# Patient Record
Sex: FEMALE | Race: WHITE
[De-identification: names, ages, dates, MRNs, and addresses within clinical notes are randomized per-mention and may not be internally consistent; named-entity substitution may affect disease eponyms.]

---

## 2018-11-17 ENCOUNTER — HOSPITAL ENCOUNTER (EMERGENCY)
Dept: HOSPITAL 63 - ER | Age: 83
Discharge: HOME | End: 2018-11-17
Payer: MEDICARE

## 2018-11-17 VITALS — HEIGHT: 63 IN | BODY MASS INDEX: 25.08 KG/M2 | WEIGHT: 141.54 LBS

## 2018-11-17 VITALS — SYSTOLIC BLOOD PRESSURE: 126 MMHG | DIASTOLIC BLOOD PRESSURE: 61 MMHG

## 2018-11-17 DIAGNOSIS — Z95.0: ICD-10-CM

## 2018-11-17 DIAGNOSIS — E78.00: ICD-10-CM

## 2018-11-17 DIAGNOSIS — E11.65: ICD-10-CM

## 2018-11-17 DIAGNOSIS — Z91.041: ICD-10-CM

## 2018-11-17 DIAGNOSIS — R55: Primary | ICD-10-CM

## 2018-11-17 DIAGNOSIS — Z88.6: ICD-10-CM

## 2018-11-17 LAB
ALBUMIN SERPL-MCNC: 3.9 G/DL (ref 3.4–5)
ALBUMIN/GLOB SERPL: 1.3 {RATIO} (ref 1–1.7)
ALP SERPL-CCNC: 70 U/L (ref 46–116)
ALT SERPL-CCNC: 22 U/L (ref 14–59)
ANION GAP SERPL CALC-SCNC: 16 MMOL/L (ref 6–14)
AST SERPL-CCNC: 19 U/L (ref 15–37)
BASOPHILS # BLD AUTO: 0 X10^3/UL (ref 0–0.2)
BASOPHILS NFR BLD: 0 % (ref 0–3)
BILIRUB SERPL-MCNC: 1 MG/DL (ref 0.2–1)
BUN/CREAT SERPL: 11 (ref 6–20)
CA-I SERPL ISE-MCNC: 13 MG/DL (ref 7–20)
CALCIUM SERPL-MCNC: 9.2 MG/DL (ref 8.5–10.1)
CHLORIDE SERPL-SCNC: 95 MMOL/L (ref 98–107)
CO2 SERPL-SCNC: 22 MMOL/L (ref 21–32)
CREAT SERPL-MCNC: 1.2 MG/DL (ref 0.6–1)
EOSINOPHIL NFR BLD: 0 % (ref 0–3)
EOSINOPHIL NFR BLD: 0 X10^3/UL (ref 0–0.7)
ERYTHROCYTE [DISTWIDTH] IN BLOOD BY AUTOMATED COUNT: 13 % (ref 11.5–14.5)
GFR SERPLBLD BASED ON 1.73 SQ M-ARVRAT: 42.4 ML/MIN
GLOBULIN SER-MCNC: 3.1 G/DL (ref 2.2–3.8)
GLUCOSE SERPL-MCNC: 391 MG/DL (ref 70–99)
HCT VFR BLD CALC: 40 % (ref 36–47)
HGB BLD-MCNC: 13.6 G/DL (ref 12–15.5)
LYMPHOCYTES # BLD: 0.9 X10^3/UL (ref 1–4.8)
LYMPHOCYTES NFR BLD AUTO: 14 % (ref 24–48)
MCH RBC QN AUTO: 31 PG (ref 25–35)
MCHC RBC AUTO-ENTMCNC: 34 G/DL (ref 31–37)
MCV RBC AUTO: 91 FL (ref 79–100)
MONO #: 0.9 X10^3/UL (ref 0–1.1)
MONOCYTES NFR BLD: 14 % (ref 0–9)
NEUT #: 4.8 X10^3UL (ref 1.8–7.7)
NEUTROPHILS NFR BLD AUTO: 72 % (ref 31–73)
PLATELET # BLD AUTO: 147 X10^3/UL (ref 140–400)
POTASSIUM SERPL-SCNC: 4.2 MMOL/L (ref 3.5–5.1)
PROT SERPL-MCNC: 7 G/DL (ref 6.4–8.2)
RBC # BLD AUTO: 4.42 X10^6/UL (ref 3.5–5.4)
SODIUM SERPL-SCNC: 133 MMOL/L (ref 136–145)
WBC # BLD AUTO: 6.7 X10^3/UL (ref 4–11)

## 2018-11-17 PROCEDURE — 80053 COMPREHEN METABOLIC PANEL: CPT

## 2018-11-17 PROCEDURE — 99285 EMERGENCY DEPT VISIT HI MDM: CPT

## 2018-11-17 PROCEDURE — 82947 ASSAY GLUCOSE BLOOD QUANT: CPT

## 2018-11-17 PROCEDURE — 93005 ELECTROCARDIOGRAM TRACING: CPT

## 2018-11-17 PROCEDURE — 85025 COMPLETE CBC W/AUTO DIFF WBC: CPT

## 2018-11-17 PROCEDURE — 36415 COLL VENOUS BLD VENIPUNCTURE: CPT

## 2018-11-17 PROCEDURE — 96360 HYDRATION IV INFUSION INIT: CPT

## 2018-11-17 NOTE — PHYS DOC
Past History


Past Medical History:  Diabetes, High Cholesterol, Other


Past Surgical History:  Colectomy, Hysterectomy, Oophorectomy, Pacemaker


Alcohol Use:  None


Drug Use:  None





Adult General


Chief Complaint


Chief Complaint:  SYNCOPE





HPI


HPI





Patient is an 88-year-old female who presents with report of having had 

syncopal episode. Patient had been shopping at [employer] when all of a sudden she 

became nauseated and broke out into a sweat. She states that she has had many 

syncopal episodes in her life and she knew that she was getting ready to pass 

out so she just sat down as quickly as she could. Patient is unsure how long 

she was out for. She denies any chest pain or shortness of breath. Patient 

states that she is still just a little bit nauseated. She denies palpitations.





Review of Systems


Review of Systems





Constitutional: Denies fever or chills []


Respiratory: Denies cough or shortness of breath []


Cardiovascular: No additional information not addressed in HPI []


GI: Denies abdominal pain, vomiting or diarrhea. []


: Denies dysuria or hematuria []


Musculoskeletal: Denies back pain or joint pain []


Neurologic: Complains of syncopal episode.[]





All other systems were reviewed and found to be within normal limits, except as 

documented in this note.





Current Medications


Current Medications





Current Medications








 Medications


  (Trade)  Dose


 Ordered  Sig/Rosetta  Start Time


 Stop Time Status Last Admin


Dose Admin


 


 Sodium Chloride  500 ml @ 


 500 mls/hr  Q1H  11/17/18 13:00


 11/17/18 13:24 DC 11/17/18 13:24


500 MLS/HR











Allergies


Allergies





Allergies








Coded Allergies Type Severity Reaction Last Updated Verified


 


  aspirin Allergy Intermediate  12/10/14 Yes


 


  I S O L A T I O N *CONTACT* Allergy Unknown  7/13/15 No











Physical Exam


Physical Exam





Constitutional: Well developed, well nourished, no acute distress, non-toxic 

appearance. []


HENT: Normocephalic, atraumatic, bilateral external ears normal, oropharynx 

moist, no oral exudates, nose normal. []


Eyes: PERRLA, EOMI, conjunctiva normal, no discharge. [] 


Neck: Normal range of motion, no tenderness, supple, no stridor. [] 


Cardiovascular:Heart rate regular rhythm []


Lungs & Thorax:  Bilateral breath sounds clear to auscultation []


Abdomen: Bowel sounds normal, soft, no tenderness. [] 


Skin: Warm, dry, no erythema, no rash. [] 


Extremities: No tenderness, no cyanosis, no clubbing, ROM intact, no edema. [] 


Neurologic: Alert and oriented X 3, normal motor function, normal sensory 

function, no focal deficits noted. []





Current Patient Data


Vital Signs





 Vital Signs








  Date Time  Temp Pulse Resp B/P (MAP) Pulse Ox O2 Delivery O2 Flow Rate FiO2


 


11/17/18 12:23 98.2 71 18  97 Room Air  








Lab Results





 Laboratory Tests








Test


 11/17/18


12:27


 


White Blood Count


 6.7 x10^3/uL


(4.0-11.0)


 


Red Blood Count


 4.42 x10^6/uL


(3.50-5.40)


 


Hemoglobin


 13.6 g/dL


(12.0-15.5)


 


Hematocrit


 40.0 %


(36.0-47.0)


 


Mean Corpuscular Volume


 91 fL ()





 


Mean Corpuscular Hemoglobin 31 pg (25-35)  


 


Mean Corpuscular Hemoglobin


Concent 34 g/dL


(31-37)


 


Red Cell Distribution Width


 13.0 %


(11.5-14.5)


 


Platelet Count


 147 x10^3/uL


(140-400)


 


Neutrophils (%) (Auto) 72 % (31-73)  


 


Lymphocytes (%) (Auto) 14 % (24-48)  L


 


Monocytes (%) (Auto) 14 % (0-9)  H


 


Eosinophils (%) (Auto) 0 % (0-3)  


 


Basophils (%) (Auto) 0 % (0-3)  


 


Neutrophils # (Auto)


 4.8 x10^3uL


(1.8-7.7)


 


Lymphocytes # (Auto)


 0.9 x10^3/uL


(1.0-4.8)  L


 


Monocytes # (Auto)


 0.9 x10^3/uL


(0.0-1.1)


 


Eosinophils # (Auto)


 0.0 x10^3/uL


(0.0-0.7)


 


Basophils # (Auto)


 0.0 x10^3/uL


(0.0-0.2)


 


Sodium Level


 133 mmol/L


(136-145)  L


 


Potassium Level


 4.2 mmol/L


(3.5-5.1)


 


Chloride Level


 95 mmol/L


()  L


 


Carbon Dioxide Level


 22 mmol/L


(21-32)


 


Anion Gap 16 (6-14)  H


 


Blood Urea Nitrogen


 13 mg/dL


(7-20)


 


Creatinine


 1.2 mg/dL


(0.6-1.0)  H


 


Estimated GFR


(Cockcroft-Gault) 42.4  





 


BUN/Creatinine Ratio 11 (6-20)  


 


Glucose Level


 391 mg/dL


(70-99)  H


 


Calcium Level


 9.2 mg/dL


(8.5-10.1)


 


Total Bilirubin


 1.0 mg/dL


(0.2-1.0)


 


Aspartate Amino Transferase


(AST) 19 U/L (15-37)





 


Alanine Aminotransferase (ALT)


 22 U/L (14-59)





 


Alkaline Phosphatase


 70 U/L


()


 


Total Protein


 7.0 g/dL


(6.4-8.2)


 


Albumin


 3.9 g/dL


(3.4-5.0)


 


Albumin/Globulin Ratio 1.3 (1.0-1.7)  











EKG


EKG


EKG demonstrates sinus rhythm with rate of 75.





Radiology/Procedures


Radiology/Procedures


[]





Course & Med Decision Making


Course & Med Decision Making


Pertinent Labs and Imaging studies reviewed. (See chart for details)





[]





Dragon Disclaimer


Dragon Disclaimer


This electronic medical record was generated, in whole or in part, using a 

voice recognition dictation system.





Departure


Departure:


Impression:  


 Primary Impression:  


 Vasovagal syncope


 Additional Impression:  


 Hyperglycemia due to type 2 diabetes mellitus


Disposition:  01 HOME, SELF-CARE


Condition:  STABLE


Referrals:  


DOUG SINHA MD (PCP)


Patient Instructions:  Hyperglycemia, Syncope, Type 2 Diabetes Mellitus, Adult





Problem Qualifiers








 Additional Impression:  


 Hyperglycemia due to type 2 diabetes mellitus


 Diabetes mellitus long term insulin use:  without long term use  Qualified 

Codes:  E11.65 - Type 2 diabetes mellitus with hyperglycemia








PINEDA REYNA Jr. DO Nov 17, 2018 14:26

## 2018-11-19 ENCOUNTER — HOSPITAL ENCOUNTER (INPATIENT)
Dept: HOSPITAL 63 - ER | Age: 83
LOS: 2 days | Discharge: HOME | DRG: 637 | End: 2018-11-21
Attending: INTERNAL MEDICINE | Admitting: INTERNAL MEDICINE
Payer: MEDICARE

## 2018-11-19 VITALS — SYSTOLIC BLOOD PRESSURE: 110 MMHG | DIASTOLIC BLOOD PRESSURE: 68 MMHG

## 2018-11-19 VITALS — DIASTOLIC BLOOD PRESSURE: 61 MMHG | SYSTOLIC BLOOD PRESSURE: 98 MMHG

## 2018-11-19 VITALS — HEIGHT: 63 IN | WEIGHT: 134.37 LBS | BODY MASS INDEX: 23.81 KG/M2

## 2018-11-19 VITALS — SYSTOLIC BLOOD PRESSURE: 111 MMHG | DIASTOLIC BLOOD PRESSURE: 66 MMHG

## 2018-11-19 DIAGNOSIS — E87.1: ICD-10-CM

## 2018-11-19 DIAGNOSIS — E11.65: ICD-10-CM

## 2018-11-19 DIAGNOSIS — E87.6: ICD-10-CM

## 2018-11-19 DIAGNOSIS — Z90.710: ICD-10-CM

## 2018-11-19 DIAGNOSIS — E78.5: ICD-10-CM

## 2018-11-19 DIAGNOSIS — N17.0: ICD-10-CM

## 2018-11-19 DIAGNOSIS — M54.5: ICD-10-CM

## 2018-11-19 DIAGNOSIS — E11.00: Primary | ICD-10-CM

## 2018-11-19 DIAGNOSIS — Z95.0: ICD-10-CM

## 2018-11-19 DIAGNOSIS — E11.43: ICD-10-CM

## 2018-11-19 DIAGNOSIS — Z82.5: ICD-10-CM

## 2018-11-19 DIAGNOSIS — I49.5: ICD-10-CM

## 2018-11-19 DIAGNOSIS — M15.9: ICD-10-CM

## 2018-11-19 DIAGNOSIS — B37.49: ICD-10-CM

## 2018-11-19 DIAGNOSIS — Z88.8: ICD-10-CM

## 2018-11-19 DIAGNOSIS — I10: ICD-10-CM

## 2018-11-19 DIAGNOSIS — Z82.0: ICD-10-CM

## 2018-11-19 DIAGNOSIS — E86.0: ICD-10-CM

## 2018-11-19 DIAGNOSIS — E11.649: ICD-10-CM

## 2018-11-19 DIAGNOSIS — Z90.49: ICD-10-CM

## 2018-11-19 DIAGNOSIS — Z80.1: ICD-10-CM

## 2018-11-19 DIAGNOSIS — H90.5: ICD-10-CM

## 2018-11-19 DIAGNOSIS — E78.00: ICD-10-CM

## 2018-11-19 DIAGNOSIS — I48.0: ICD-10-CM

## 2018-11-19 LAB
ALBUMIN SERPL-MCNC: 3.4 G/DL (ref 3.4–5)
ALBUMIN/GLOB SERPL: 0.9 {RATIO} (ref 1–1.7)
ALP SERPL-CCNC: 71 U/L (ref 46–116)
ALT SERPL-CCNC: 22 U/L (ref 14–59)
ANION GAP SERPL CALC-SCNC: 10 MMOL/L (ref 6–14)
APTT PPP: YELLOW S
AST SERPL-CCNC: 18 U/L (ref 15–37)
BACTERIA #/AREA URNS HPF: (no result) /HPF
BASOPHILS # BLD AUTO: 0 X10^3/UL (ref 0–0.2)
BASOPHILS NFR BLD: 0 % (ref 0–3)
BILIRUB SERPL-MCNC: 1.2 MG/DL (ref 0.2–1)
BILIRUB UR QL STRIP: (no result)
BUN/CREAT SERPL: 12 (ref 6–20)
CA-I SERPL ISE-MCNC: 14 MG/DL (ref 7–20)
CALCIUM SERPL-MCNC: 8.8 MG/DL (ref 8.5–10.1)
CHLORIDE SERPL-SCNC: 94 MMOL/L (ref 98–107)
CO2 SERPL-SCNC: 25 MMOL/L (ref 21–32)
CREAT SERPL-MCNC: 1.2 MG/DL (ref 0.6–1)
EOSINOPHIL NFR BLD: 0 % (ref 0–3)
EOSINOPHIL NFR BLD: 0 X10^3/UL (ref 0–0.7)
ERYTHROCYTE [DISTWIDTH] IN BLOOD BY AUTOMATED COUNT: 13 % (ref 11.5–14.5)
FIBRINOGEN PPP-MCNC: (no result) MG/DL
GFR SERPLBLD BASED ON 1.73 SQ M-ARVRAT: 42.4 ML/MIN
GLOBULIN SER-MCNC: 3.8 G/DL (ref 2.2–3.8)
GLUCOSE SERPL-MCNC: 500 MG/DL (ref 70–99)
GLUCOSE UR STRIP-MCNC: >=1000 MG/DL
HCT VFR BLD CALC: 36.5 % (ref 36–47)
HGB BLD-MCNC: 12.5 G/DL (ref 12–15.5)
LYMPHOCYTES # BLD: 0.8 X10^3/UL (ref 1–4.8)
LYMPHOCYTES NFR BLD AUTO: 11 % (ref 24–48)
MCH RBC QN AUTO: 31 PG (ref 25–35)
MCHC RBC AUTO-ENTMCNC: 34 G/DL (ref 31–37)
MCV RBC AUTO: 89 FL (ref 79–100)
MONO #: 1 X10^3/UL (ref 0–1.1)
MONOCYTES NFR BLD: 14 % (ref 0–9)
NEUT #: 5.3 X10^3UL (ref 1.8–7.7)
NEUTROPHILS NFR BLD AUTO: 75 % (ref 31–73)
NITRITE UR QL STRIP: (no result)
PLATELET # BLD AUTO: 150 X10^3/UL (ref 140–400)
POTASSIUM SERPL-SCNC: 3.5 MMOL/L (ref 3.5–5.1)
PROT SERPL-MCNC: 7.2 G/DL (ref 6.4–8.2)
RBC # BLD AUTO: 4.09 X10^6/UL (ref 3.5–5.4)
RBC #/AREA URNS HPF: (no result) /HPF (ref 0–2)
SODIUM SERPL-SCNC: 129 MMOL/L (ref 136–145)
SP GR UR STRIP: <=1.005
SQUAMOUS #/AREA URNS LPF: (no result) /LPF
UROBILINOGEN UR-MCNC: 0.2 MG/DL
WBC # BLD AUTO: 7.1 X10^3/UL (ref 4–11)
WBC #/AREA URNS HPF: (no result) /HPF (ref 0–4)
YEAST #/AREA URNS HPF: PRESENT /HPF

## 2018-11-19 PROCEDURE — 93005 ELECTROCARDIOGRAM TRACING: CPT

## 2018-11-19 PROCEDURE — 82010 KETONE BODYS QUAN: CPT

## 2018-11-19 PROCEDURE — 82947 ASSAY GLUCOSE BLOOD QUANT: CPT

## 2018-11-19 PROCEDURE — 80061 LIPID PANEL: CPT

## 2018-11-19 PROCEDURE — 36415 COLL VENOUS BLD VENIPUNCTURE: CPT

## 2018-11-19 PROCEDURE — 87186 SC STD MICRODIL/AGAR DIL: CPT

## 2018-11-19 PROCEDURE — 96361 HYDRATE IV INFUSION ADD-ON: CPT

## 2018-11-19 PROCEDURE — 85025 COMPLETE CBC W/AUTO DIFF WBC: CPT

## 2018-11-19 PROCEDURE — 93306 TTE W/DOPPLER COMPLETE: CPT

## 2018-11-19 PROCEDURE — 80053 COMPREHEN METABOLIC PANEL: CPT

## 2018-11-19 PROCEDURE — 80048 BASIC METABOLIC PNL TOTAL CA: CPT

## 2018-11-19 PROCEDURE — 85651 RBC SED RATE NONAUTOMATED: CPT

## 2018-11-19 PROCEDURE — 87641 MR-STAPH DNA AMP PROBE: CPT

## 2018-11-19 PROCEDURE — 81001 URINALYSIS AUTO W/SCOPE: CPT

## 2018-11-19 PROCEDURE — 86140 C-REACTIVE PROTEIN: CPT

## 2018-11-19 PROCEDURE — 87086 URINE CULTURE/COLONY COUNT: CPT

## 2018-11-19 PROCEDURE — 96374 THER/PROPH/DIAG INJ IV PUSH: CPT

## 2018-11-19 PROCEDURE — 85027 COMPLETE CBC AUTOMATED: CPT

## 2018-11-19 RX ADMIN — INSULIN LISPRO SCH UNITS: 100 INJECTION, SOLUTION INTRAVENOUS; SUBCUTANEOUS at 18:15

## 2018-11-19 RX ADMIN — APIXABAN SCH MG: 5 TABLET, FILM COATED ORAL at 20:30

## 2018-11-19 NOTE — PHYS DOC
Past History


Past Medical History:  Diabetes, High Cholesterol, Other


Past Surgical History:  Colectomy, Hysterectomy, Oophorectomy, Pacemaker


Alcohol Use:  None


Drug Use:  None





Adult General


Chief Complaint


Chief Complaint:  BLOOD SUGAR PROBLEM





HPI


HPI


88-year-old female presents with hyperglycemia. She states that her blood sugar 

was over 500 today. Patient is feeling okay except for she has had 2 days 

increased urination and feels more fatigued than normal. She has never been 

placed on diabetes medications long-term. She was trialed on the medicine at 

one point. Her physician was very concerned about her becoming hypoglycemic. 

She denies fever or chills.





Review of Systems


Review of Systems





Constitutional: Fatigue.  Denies fever or chills []


Eyes: Denies change in visual acuity, redness, or eye pain []


HENT: Denies nasal congestion or sore throat []


Respiratory: Denies cough or shortness of breath []


Cardiovascular: No additional information not addressed in HPI []


GI: Denies abdominal pain, nausea, vomiting, bloody stools or diarrhea []


: Frequent urination[]


Musculoskeletal: Denies back pain or joint pain []


Integument: Denies rash or skin lesions []


Neurologic: Denies headache, focal weakness or sensory changes []


Endocrine: Denies polyuria or polydipsia []





All other systems were reviewed and found to be within normal limits, except as 

documented in this note.





Current Medications


Current Medications





Current Medications








 Medications


  (Trade)  Dose


 Ordered  Sig/Rosetta  Start Time


 Stop Time Status Last Admin


Dose Admin


 


 Insulin Human


 Regular


  (HumuLIN R VIAL)  10 unit  1X  ONCE  11/19/18 15:00


 11/19/18 15:01  11/19/18 14:51


10 UNIT


 


 Sodium Chloride  1,000 ml @ 


 1,000 mls/hr  1X  ONCE  11/19/18 14:30


 11/19/18 15:29  11/19/18 14:30


1,000 MLS/HR











Allergies


Allergies





Allergies








Coded Allergies Type Severity Reaction Last Updated Verified


 


  aspirin Allergy Intermediate  12/10/14 Yes


 


  I S O L A T I O N *CONTACT* Allergy Unknown  7/13/15 No











Physical Exam


Physical Exam





Constitutional: Well developed, well nourished, no acute distress, non-toxic 

appearance. []


HENT: Normocephalic, atraumatic, bilateral external ears normal, oropharynx 

moist, no oral exudates, nose normal. []


Eyes: PERRLA, EOMI, conjunctiva normal, no discharge. [] 


Neck: Normal range of motion, no tenderness, supple, no stridor. [] 


Cardiovascular:Heart rate regular rhythm, no murmur []


Lungs & Thorax:  Bilateral breath sounds clear to auscultation []


Abdomen: Bowel sounds normal, soft, no tenderness, no masses, no pulsatile 

masses. [] 


Skin: Warm, dry, no erythema, no rash. [] 


Back: No tenderness, no CVA tenderness. [] 


Extremities: No tenderness, no cyanosis, no clubbing, ROM intact, no edema. [] 


Neurologic: Alert and oriented X 3, normal motor function, normal sensory 

function, no focal deficits noted. []


Psychologic: Affect normal, judgement normal, mood normal. []





Current Patient Data


Lab Results





 Laboratory Tests








Test


 11/19/18


13:39 11/19/18


13:50


 


Glucose (Fingerstick)


 534 mg/dL


(70-99)  *H 





 


White Blood Count


 


 7.1 x10^3/uL


(4.0-11.0)


 


Red Blood Count


 


 4.09 x10^6/uL


(3.50-5.40)


 


Hemoglobin


 


 12.5 g/dL


(12.0-15.5)


 


Hematocrit


 


 36.5 %


(36.0-47.0)


 


Mean Corpuscular Volume


 


 89 fL ()





 


Mean Corpuscular Hemoglobin  31 pg (25-35)  


 


Mean Corpuscular Hemoglobin


Concent 


 34 g/dL


(31-37)


 


Red Cell Distribution Width


 


 13.0 %


(11.5-14.5)


 


Platelet Count


 


 150 x10^3/uL


(140-400)


 


Neutrophils (%) (Auto)  75 % (31-73)  H


 


Lymphocytes (%) (Auto)  11 % (24-48)  L


 


Monocytes (%) (Auto)  14 % (0-9)  H


 


Eosinophils (%) (Auto)  0 % (0-3)  


 


Basophils (%) (Auto)  0 % (0-3)  


 


Neutrophils # (Auto)


 


 5.3 x10^3uL


(1.8-7.7)


 


Lymphocytes # (Auto)


 


 0.8 x10^3/uL


(1.0-4.8)  L


 


Monocytes # (Auto)


 


 1.0 x10^3/uL


(0.0-1.1)


 


Eosinophils # (Auto)


 


 0.0 x10^3/uL


(0.0-0.7)


 


Basophils # (Auto)


 


 0.0 x10^3/uL


(0.0-0.2)


 


Sodium Level


 


 129 mmol/L


(136-145)  L


 


Potassium Level


 


 3.5 mmol/L


(3.5-5.1)


 


Chloride Level


 


 94 mmol/L


()  L


 


Carbon Dioxide Level


 


 25 mmol/L


(21-32)


 


Anion Gap  10 (6-14)  


 


Blood Urea Nitrogen


 


 14 mg/dL


(7-20)


 


Creatinine


 


 1.2 mg/dL


(0.6-1.0)  H


 


Estimated GFR


(Cockcroft-Gault) 


 42.4  





 


BUN/Creatinine Ratio  12 (6-20)  


 


Glucose Level


 


 500 mg/dL


(70-99)  *H


 


Calcium Level


 


 8.8 mg/dL


(8.5-10.1)


 


Total Bilirubin


 


 1.2 mg/dL


(0.2-1.0)  H


 


Aspartate Amino Transferase


(AST) 


 18 U/L (15-37)





 


Alanine Aminotransferase (ALT)


 


 22 U/L (14-59)





 


Alkaline Phosphatase


 


 71 U/L


()


 


Total Protein


 


 7.2 g/dL


(6.4-8.2)


 


Albumin


 


 3.4 g/dL


(3.4-5.0)


 


Albumin/Globulin Ratio


 


 0.9 (1.0-1.7)


L


 


Acetone Level  Neg (NEG)  











EKG


EKG


Sinus rhythm, rate 77, normal axis, no ST elevations or depressions, PVCs.[]





Radiology/Procedures


Radiology/Procedures


[]





Course & Med Decision Making


Course & Med Decision Making


Pertinent Labs and Imaging studies reviewed. (See chart for details)


The patient's fingerstick blood sugar was over 500. Her lab blood sugar was 

also 500. I will give her 10 units of regular insulin IV as well as a liter of 

normal saline. Given the patient's age and lack of glycemic control, I feel it 

is prudent to admit her to the hospital for medication regulation. Her acetone 

was negative. The patient and her family are in agreement with admission. The 

patient's urine was significant for yeast and white cells. He was nitrate and 

leukocyte esterase negative. I will treat her with 150 mg of Diflucan. I 

discussed the patient with Dr. Gan and he has agreed to admit the patient for 

further diabetes management. Her repeat blood sugar was 302.


[]





Dragon Disclaimer


Dragon Disclaimer


This electronic medical record was generated, in whole or in part, using a 

voice recognition dictation system.





Departure


Departure:


Referrals:  


DOUG SINHA MD (PCP)











MONI MENESES DO Nov 19, 2018 15:09

## 2018-11-20 VITALS — SYSTOLIC BLOOD PRESSURE: 104 MMHG | DIASTOLIC BLOOD PRESSURE: 62 MMHG

## 2018-11-20 VITALS — DIASTOLIC BLOOD PRESSURE: 54 MMHG | SYSTOLIC BLOOD PRESSURE: 106 MMHG

## 2018-11-20 VITALS — SYSTOLIC BLOOD PRESSURE: 99 MMHG | DIASTOLIC BLOOD PRESSURE: 64 MMHG

## 2018-11-20 VITALS — DIASTOLIC BLOOD PRESSURE: 52 MMHG | SYSTOLIC BLOOD PRESSURE: 111 MMHG

## 2018-11-20 VITALS — DIASTOLIC BLOOD PRESSURE: 64 MMHG | SYSTOLIC BLOOD PRESSURE: 102 MMHG

## 2018-11-20 VITALS — SYSTOLIC BLOOD PRESSURE: 106 MMHG | DIASTOLIC BLOOD PRESSURE: 63 MMHG

## 2018-11-20 LAB
ANION GAP SERPL CALC-SCNC: 13 MMOL/L (ref 6–14)
CA-I SERPL ISE-MCNC: 9 MG/DL (ref 7–20)
CALCIUM SERPL-MCNC: 8.4 MG/DL (ref 8.5–10.1)
CHLORIDE SERPL-SCNC: 99 MMOL/L (ref 98–107)
CO2 SERPL-SCNC: 22 MMOL/L (ref 21–32)
CREAT SERPL-MCNC: 0.8 MG/DL (ref 0.6–1)
GFR SERPLBLD BASED ON 1.73 SQ M-ARVRAT: 67.7 ML/MIN
GLUCOSE SERPL-MCNC: 210 MG/DL (ref 70–99)
POTASSIUM SERPL-SCNC: 3.1 MMOL/L (ref 3.5–5.1)
SODIUM SERPL-SCNC: 134 MMOL/L (ref 136–145)

## 2018-11-20 RX ADMIN — INSULIN LISPRO SCH UNITS: 100 INJECTION, SOLUTION INTRAVENOUS; SUBCUTANEOUS at 12:21

## 2018-11-20 RX ADMIN — GLIMEPIRIDE SCH MG: 2 TABLET ORAL at 20:59

## 2018-11-20 RX ADMIN — INSULIN LISPRO SCH UNITS: 100 INJECTION, SOLUTION INTRAVENOUS; SUBCUTANEOUS at 17:14

## 2018-11-20 RX ADMIN — POTASSIUM CHLORIDE SCH MEQ: 1500 TABLET, EXTENDED RELEASE ORAL at 20:59

## 2018-11-20 RX ADMIN — METOPROLOL SUCCINATE SCH MG: 25 TABLET, EXTENDED RELEASE ORAL at 08:24

## 2018-11-20 RX ADMIN — APIXABAN SCH MG: 5 TABLET, FILM COATED ORAL at 20:58

## 2018-11-20 RX ADMIN — CLOPIDOGREL BISULFATE SCH MG: 75 TABLET ORAL at 08:22

## 2018-11-20 RX ADMIN — POTASSIUM CHLORIDE SCH MEQ: 1500 TABLET, EXTENDED RELEASE ORAL at 17:08

## 2018-11-20 RX ADMIN — SODIUM CHLORIDE SCH MLS/HR: 0.9 INJECTION, SOLUTION INTRAVENOUS at 14:45

## 2018-11-20 RX ADMIN — APIXABAN SCH MG: 5 TABLET, FILM COATED ORAL at 08:22

## 2018-11-20 RX ADMIN — VITAMIN D, TAB 1000IU (100/BT) SCH UNIT: 25 TAB at 08:24

## 2018-11-20 RX ADMIN — INSULIN LISPRO SCH UNITS: 100 INJECTION, SOLUTION INTRAVENOUS; SUBCUTANEOUS at 08:00

## 2018-11-20 NOTE — HP
ADMIT DATE:  2018



HISTORY OF PRESENT ILLNESS:  The patient is an 88-year-old  female

patient who came to the Emergency Room as her blood sugar was found to be

extremely high.  She apparently had had a syncopal episode last Saturday at

[facility] and was brought to the Emergency Room.  At that time, her blood sugar

was found to be 300 mg.  She was treated and was sent home only to have another

syncopal episode.  Her daughter said that they went both together to [facility] and

she noted that she was not energetic and very tired and found at both a

glucometer and found her blood sugar to be extremely high and therefore, she was

sent to the Emergency Room for further evaluation and treatment.  On questioning

her further, she said that she has had polydipsia, polyuria and she has also

increased appetite, although she has not regained any weight.  She also did

complain of pain in both shoulder joint and had had multiple syncopal episodes. 

She is known to have sick sinus syndrome for which she has a permanent

pacemaker.  Her pacemaker was interrogated about a year ago and was functioning

well.  She is Due to be interrogated at the beginning of next month.



PAST MEDICAL HISTORY:  Significant for hypertension, atrial fibrillation, sick

sinus syndrome, generalized osteoarthritis, sensorineural deafness and type 2

diabetes.  Apparently,  6 months ago, they tried Glucophage and apparently

caused a lot of GI side effects and the ID Glucophage was discontinued, but no

further action was done to improve her glycemic control.



ALLERGIES:  SHE IS ALLERGIC TO ASPIRIN.



MEDICATIONS:  She is currently on the following medication at home, she is on

apixaban 5 mg twice a day, Plavix 75 mg once a day, metoprolol succinate 25 mg

once a day, ergocalciferol, vitamin D3 10,000 international unit once a day,

cranberry extract, vitamin C 1 tablet once a day.



REVIEW OF SYSTEMS:  The patient denied any blurring of vision.  She has

bilateral cataract extraction, but denied any glaucoma or macular degeneration. 

Denied any earache, tinnitus or sensorineural deafness.  Denied any nosebleeds,

stuffy nose or postnasal drip.  Denied any sore throat, sore tongue, toothache,

hoarseness of voice or difficulty swallowing.  Denied any nausea, vomiting,

diarrhea or constipation.  Did complain of polyuria and polydipsia.  Denied any

chest pain, shortness of breath, orthopnea or paroxysmal nocturnal dyspnea.  Did

complain of current onset of syncope.  She has bilateral hearing aids.



FAMILY HISTORY:  Strongly positive for lung cancer in her brother and sisters. 

Her father  at the age of 60 because of emphysema.  Her mother  in her

80s secondary to Alzheimer's disease.



SOCIAL HISTORY:  She is  and lives alone.  She is fairly independent. 

She has 4 daughters, one of them  because of neurodegenerative disease. 

She never smoked.  She does not drink alcohol.  She used to be a school

 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



PHYSICAL EXAMINATION:

GENERAL:  On arrival to the Emergency Room, she looked well and was clearly in

no apparent respiratory distress, slightly pale, but no jaundice, cyanosis, or

thyromegaly.  No jugular venous distension.  No limb edema.

VITAL SIGNS:  Her heart rate was 78, blood pressure 122/67, temperature was

97.9, respiratory rate was 18 and oxygen saturation was 98%.

HEAD, EYES, EARS, NOSE AND THROAT:  Showed normocephalic, atraumatic.

NECK:  Supple.

HEART:  Showed normal first and second heart sounds with no gallop, rub or

murmur.

CHEST:  Clear to auscultation.  No crepitation or rhonchi.

ABDOMEN:  Distended, soft, nontender.

NEUROLOGIC:  She was awake, alert, hard of hearing, otherwise all cranial nerves

intact.

EXTREMITIES:  She moves extremities without difficulty.  She ambulates without

difficulty.



LABORATORY DATA:  On admission showed her white cell count to be 7100,

hemoglobin 12.5, hematocrit 36.5, MCV 89 and platelet count of 150,000 with

normal manual differential.  Her chemistry on admission showed her serum sodium

was 129, potassium 3.5, chloride 94, bicarbonate 25, anion gap of 10, BUN 14,

creatinine 1.2, estimated GFR was 42 mL per minute.  Her glucose was 534,

calcium was 8.8.  Total bilirubin 1.2, AST, ALT, alkaline phosphatase normal. 

Total protein was 7.2, albumin was 3.4.  Her urinalysis showed the urine was

yellow, cloudy with a pH of 5.5, specific gravity 1.005.  The urine was negative

for protein.  There was large amount of glucose, trace of ketone, small amount

of blood, negative for nitrite and bilirubin.  There is the urine was negative

for leukocyte esterase, 1-2 rbc's, 20-40 wbc's, moderate amount of many bacteria

and urine yeast was present  Acetone was negative.



She was admitted basically with poorly controlled type 2 diabetes mellitus for

which we will start her on oral hypoglycemic agent.  I will start with Diflucan.

 I will start with oral hypoglycemic agent in the form of Amaryl 2 mg and

Glucophage extended release 500 mg will decrease the dose slowly.  I will treat

also possible urinary tract infection with IV antibiotic as well as yeast

infection.  I would check also her sed rate and C-reactive protein.  She has

pain in both shoulders concerning for polymyalgia rheumatica.





______________________________

MARYLU BUSTOS MD



DR:  MARIE/shantelle  JOB#:  [id number] / [id number]

DD:  2018 14:28  DT:  2018 15:05

## 2018-11-20 NOTE — PN
DATE:  11/20/2018



SUBJECTIVE:  The patient is resting, slightly propped up in bed, in no apparent

distress.  She is awake, alert.  On questioning her, she is complaining of pain

in both shoulder joint and also the low back pain.  She was admitted with poorly

controlled type 2 diabetes.  Her blood sugar was 534.  She has dilutional

hyponatremia and she was slightly dehydrated.  Her urinalysis showed that she

has yeast in her urine, treated with Diflucan and we did start her on Amaryl 2

mg once a day as well as metformin extended release 500 mg once a day.  We will

continue with all her other medications together with a low dose sliding scale.



PHYSICAL EXAMINATION:

GENERAL:  When I saw her today, she looked well and was clearly in no apparent

respiratory distress, pale, not jaundice, cyanosis or thyromegaly.  No jugular

venous distention.  No limb edema.

VITAL SIGNS:  Her heart rate was 81, blood pressure was 111/66, temperature was

98.2, respiratory rate was 16, and oxygen saturation was 93% on room air.

HEAD, EYES, EARS, NOSE AND THROAT:  Showed normocephalic, atraumatic.

NECK:  Supple.

HEART:  Showed normal first and second heart sounds.  No gallop, rub or murmur.

CHEST:  Clear to auscultation.  No crepitation or rhonchi.

ABDOMEN:  Distended, soft, nontender.  No guarding or rigidity.  No

organomegaly.  All hernial orifice intact.  Bowel sounds normal.

NEUROLOGIC:  She was awake, alert, hard of hearing with all cranial nerves

intact.  She moves extremities without difficulty.



Her intake was 616 was recorded.



LABORATORY DATA:  Her lab work this morning showed a serum sodium 134, potassium

3.1, chloride was 99, bicarbonate 22, anion gap of 13, BUN 99, creatinine 0.8,

estimated GFR was 67 mL per minute.  Her glucose was 210 and calcium was 8.4. 

Her white cell count was 7000, hemoglobin 12.5, hematocrit 36.5, MCV 89 and

platelet count of 150,000.



ASSESSMENT:  This is an 88-year-old  female patient, who was admitted

with poorly controlled blood sugar with marked hyperglycemia without any

ketoacidosis.  She has multiple other medical problems including acute kidney

injury and due to dehydration, her creatinine was up to 1.2, has improved to

0.8.  Has also hypokalemia, dilutional hyponatremia, hypertension, atrial

fibrillation, sick sinus syndrome, generalized osteoarthritis, ensorineural

deafness.



PLAN:  My plan is to increase her Amaryl to 2 mg twice a day and her Glucophage

500 mg twice a day.  I will replenish her potassium and check her sed rate and

C-reactive protein and decide the further management accordingly.





______________________________

MARYLU BUSTOS MD



DR:  MARIE/shantelle  JOB#:  [id number] / [id number]

DD:  11/20/2018 14:34  DT:  11/20/2018 21:55

## 2018-11-20 NOTE — PDOC2
CONSULT


Date of Admission


DATE: 11/20/18 


TIME: 16:18


Reason for Consult:


syncope


Problem List


Problems


Medical Problems:


(1) Hyperglycemia


Status: Acute  





(2) Yeast UTI


Status: Acute  








History of Present Illness


Ms Madrigal is an 88 year old female who presents with complaints of recurrent 

syncope.  She has a history of sick sinus syndrome, s/p ppm, paroxysmal atrial 

fibrillation, hypertension and hyperlipidemia as well as diabetes.  She was 

apparently shopping at [employer] on Saturday without any symptoms.  She reports 

that as she was standing at the check out counter she was looking at her check 

book, asked the date and the next thing she remembers several people were 

supporting her.  They assisted her to a sitting area and she says she just felt 

very tired. After about 30 minutes she apparently felt fine.  She was seen in 

the ER and found to have an elevated blood sugar in the 300s.  She was treated 

and released.  She denies any preceding symptoms to include lightheadedness, 

palpitations, diaphoresis or flushing.  She denies bite of tongue or loss of 

continence.  She complains of trouble with her memory and says it seems random 

what she cant remember but only recent things.  She apparently experienced 

another syncopal episode which is why her daughter brought her to the hospital 

today.  She does not remember this.  She denies chest pain, dyspnea, congestive 

symptoms.  she reports that she has a great deal of trouble with arthritis and 

during cold weather is unable to do many things.  She does complain of fatigue 

since cold weather started.  She denies eating a diabetic diet or taking meds 

for diabetes.


Past Medical History


other history includes diabetes, hearing loss and arthritis


Cardiovascular:  HTN, hyperipidemia, Other (sick sinus syndrome s/p PPM.  St 

Isreal pacemaker with normal check in August of 2018.  History of paroxysmal 

atrial fibrillation, device check in Aug with atrial burden of <1% and RV 

pacing of <1%.  )


Past Surgical History:  Appendectomy, Hernia Repair, Hysterectomy, Colectomy, 

Other (breast reduction and pacemaker placement)


Family History


non contributory due to age


Social History


non smoker, no significant ETOH, no illicit drugs


Current Medications





Current Medications


Sodium Chloride 1,000 ml @  1,000 mls/hr 1X  ONCE IV  Last administered on 11/19 /18at 14:30;  Start 11/19/18 at 14:30;  Stop 11/19/18 at 15:29;  Status DC


Insulin Human Regular (HumuLIN R VIAL) 10 unit 1X  ONCE IV  Last administered 

on 11/19/18at 14:51;  Start 11/19/18 at 15:00;  Stop 11/19/18 at 15:01;  Status 

DC


Fluconazole (Diflucan) 150 mg 1X  ONCE PO  Last administered on 11/19/18at 15:57

;  Start 11/19/18 at 15:45;  Stop 11/19/18 at 15:46;  Status DC


Ondansetron HCl (Zofran) 4 mg PRN Q4HRS  PRN IV NAUSEA/VOMITING;  Start 11/19/ 18 at 16:00;  Stop 11/20/18 at 15:59;  Status DC


Vitamin D (Vitamin D3) 1,000 unit DAILY PO ;  Start 11/20/18 at 09:00


Clopidogrel Bisulfate (Plavix) 75 mg DAILY PO  Last administered on 11/20/18at 

08:22;  Start 11/20/18 at 09:00


Metoprolol Succinate (Toprol Xl) 25 mg DAILY PO  Last administered on 11/20/ 18at 08:24;  Start 11/20/18 at 09:00


Apixaban (Eliquis) 5 mg BID PO  Last administered on 11/20/18at 08:22;  Start 11 /19/18 at 21:00


Insulin Human Lispro (HumaLOG) 0-5 UNITS TIDWMEALS SQ  Last administered on 11/ 20/18at 12:21;  Start 11/19/18 at 17:00


Dextrose 12.5 gm PRN Q15MIN  PRN IV SEE COMMENTS;  Start 11/19/18 at 17:45


Glimepiride (Amaryl) 1 mg 1X  ONCE PO  Last administered on 11/19/18at 18:10;  

Start 11/19/18 at 17:45;  Stop 11/19/18 at 17:46;  Status DC


Glimepiride (Amaryl) 2 mg DAILY PO  Last administered on 11/20/18at 08:22;  

Start 11/20/18 at 09:00;  Stop 11/20/18 at 14:36;  Status DC


Metformin HCl (Glucophage Xr) 500 mg DAILYWBKFT PO  Last administered on 11/20/ 18at 08:22;  Start 11/20/18 at 08:00;  Stop 11/20/18 at 14:36;  Status DC


Glimepiride (Amaryl) 2 mg BID PO ;  Start 11/20/18 at 21:00


Metformin HCl (Glucophage Xr) 500 mg BID PO ;  Start 11/20/18 at 21:00


Potassium Chloride (Klor-Con) 40 meq 1X  ONCE PO ;  Start 11/20/18 at 15:00;  

Stop 11/20/18 at 15:01;  Status DC


Potassium Chloride (Klor-Con) 20 meq TID PO ;  Start 11/20/18 at 15:00


Sodium Chloride 1,000 ml @  75 mls/hr [id number] IV ;  Start 11/20/18 at 14:45


Enoxaparin Sodium (Lovenox 40mg Syringe) 40 mg Q24H SQ ;  Start 11/20/18 at 14:

45;  Stop 11/20/18 at 14:51;  Status DC





Active Scripts


Active


Reported


Azo Cranberry Softgel (Cranberry Extract/Vit C) 1 Each Capsule 1 Each PO DAILY


Eliquis (Apixaban) 5 Mg Tablet 5 Mg PO BID


Toprol Xl (Metoprolol Succinate) 25 Mg Tab.er.24h 1 Tab PO DAILY


Vitamin D3 (Cholecalciferol (Vitamin D3)) 1,000 Unit Tablet 1 Tab PO DAILY


Plavix (Clopidogrel Bisulfate) 75 Mg Tablet 1 Tab PO DAILY


     LAST DOSE GIVEN:


     DATE: TODAY


     TIME: AM


     NEXT DOSE DUE:


     DATE: TOMORROW


     TIME: AM


Allergies:  


Coded Allergies:  


     aspirin (Verified  Allergy, Intermediate, 12/10/14)


     I S O L A T I O N *CONTACT* (Unverified  Allergy, Unknown, 7/13/15)


 


 +nasal screen 7-11-15


Review of System


as per HPI


General:  YES: Fatigue


PSYCHOLOGICAL ROS:  YES: Depression


HEENT:  YES: Hearing change


Genitourinary:  YES: Other (polyuria, polydipsia)


Musculoskeletal:  YES: Joint Pain, Joint Stiffness


Neurological:  YES: Memory Loss


General:  Alert, Oriented X3, Cooperative, No acute distress


HEENT:  Atraumatic, EOMI, Other (negative carotid bruits)


Lungs:  Clear to auscultation


Heart:  Normal S1, Normal S2, Other (soft systolic murmur, no gallops, clicks 

or rubs)


Abdomen:  Normal bowel sounds, Soft, No tenderness


Extremities:  No cyanosis, Normal pulses


Neuro:  Normal speech, Strength at 5/5 X4 ext


Psych/Mental Status:  Mental status NL, Mood NL, Other (some short term memory 

loss)


VITALS





Vital Signs








  Date Time  Temp Pulse Resp B/P (MAP) Pulse Ox O2 Delivery O2 Flow Rate FiO2


 


11/20/18 14:54 98.1 94 20 106/63 (77) 94 Room Air  








Labs





Laboratory Tests








Test


 11/19/18


13:39 11/19/18


13:50 11/19/18


15:00 11/19/18


15:49


 


Glucose (Fingerstick)


 534 mg/dL


(70-99) 


 


 302 mg/dL


(70-99)


 


White Blood Count


 


 7.1 x10^3/uL


(4.0-11.0) 


 





 


Red Blood Count


 


 4.09 x10^6/uL


(3.50-5.40) 


 





 


Hemoglobin


 


 12.5 g/dL


(12.0-15.5) 


 





 


Hematocrit


 


 36.5 %


(36.0-47.0) 


 





 


Mean Corpuscular Volume  89 fL ()   


 


Mean Corpuscular Hemoglobin  31 pg (25-35)   


 


Mean Corpuscular Hemoglobin


Concent 


 34 g/dL


(31-37) 


 





 


Red Cell Distribution Width


 


 13.0 %


(11.5-14.5) 


 





 


Platelet Count


 


 150 x10^3/uL


(140-400) 


 





 


Neutrophils (%) (Auto)  75 % (31-73)   


 


Lymphocytes (%) (Auto)  11 % (24-48)   


 


Monocytes (%) (Auto)  14 % (0-9)   


 


Eosinophils (%) (Auto)  0 % (0-3)   


 


Basophils (%) (Auto)  0 % (0-3)   


 


Neutrophils # (Auto)


 


 5.3 x10^3uL


(1.8-7.7) 


 





 


Lymphocytes # (Auto)


 


 0.8 x10^3/uL


(1.0-4.8) 


 





 


Monocytes # (Auto)


 


 1.0 x10^3/uL


(0.0-1.1) 


 





 


Eosinophils # (Auto)


 


 0.0 x10^3/uL


(0.0-0.7) 


 





 


Basophils # (Auto)


 


 0.0 x10^3/uL


(0.0-0.2) 


 





 


Sodium Level


 


 129 mmol/L


(136-145) 


 





 


Potassium Level


 


 3.5 mmol/L


(3.5-5.1) 


 





 


Chloride Level


 


 94 mmol/L


() 


 





 


Carbon Dioxide Level


 


 25 mmol/L


(21-32) 


 





 


Anion Gap  10 (6-14)   


 


Blood Urea Nitrogen


 


 14 mg/dL


(7-20) 


 





 


Creatinine


 


 1.2 mg/dL


(0.6-1.0) 


 





 


Estimated GFR


(Cockcroft-Gault) 


 42.4 


 


 





 


BUN/Creatinine Ratio  12 (6-20)   


 


Glucose Level


 


 500 mg/dL


(70-99) 


 





 


Calcium Level


 


 8.8 mg/dL


(8.5-10.1) 


 





 


Total Bilirubin


 


 1.2 mg/dL


(0.2-1.0) 


 





 


Aspartate Amino Transf


(AST/SGOT) 


 18 U/L (15-37) 


 


 





 


Alanine Aminotransferase


(ALT/SGPT) 


 22 U/L (14-59) 


 


 





 


Alkaline Phosphatase


 


 71 U/L


() 


 





 


Total Protein


 


 7.2 g/dL


(6.4-8.2) 


 





 


Albumin


 


 3.4 g/dL


(3.4-5.0) 


 





 


Albumin/Globulin Ratio  0.9 (1.0-1.7)   


 


Acetone Level  Neg (NEG)   


 


Urine Collection Type   Unknown  


 


Urine Color   Yellow  


 


Urine Clarity   Cloudy  


 


Urine pH   5.5  


 


Urine Specific Gravity   <=1.005  


 


Urine Protein


 


 


 Neg


(NEG-TRACE) 





 


Urine Glucose (UA)


 


 


 >=1000 mg/dL


(NEG) 





 


Urine Ketones (Stick)   15 mg/dL (NEG)  


 


Urine Blood   Small (NEG)  


 


Urine Nitrite   Neg (NEG)  


 


Urine Bilirubin   Neg (NEG)  


 


Urine Urobilinogen Dipstick


 


 


 0.2 mg/dL (0.2


mg/dL) 





 


Urine Leukocyte Esterase   Neg (NEG)  


 


Urine RBC   1-2 /HPF (0-2)  


 


Urine WBC


 


 


 20-40 /HPF


(0-4) 





 


Urine Squamous Epithelial


Cells 


 


 Mod /LPF 


 





 


Urine Bacteria


 


 


 Many /HPF


(0-FEW) 





 


Urine Yeast   Present /HPF  


 


Test


 11/19/18


16:57 11/19/18


17:33 11/19/18


20:26 11/20/18


04:50


 


Glucose (Fingerstick)


 226 mg/dL


(70-99) 217 mg/dL


(70-99) 368 mg/dL


(70-99) 





 


Nasal Screen MRSA (PCR)


 


 


 


 Negative


(Negative)


 


Test


 11/20/18


05:30 11/20/18


07:39 11/20/18


11:42 





 


Sodium Level


 134 mmol/L


(136-145) 


 


 





 


Potassium Level


 3.1 mmol/L


(3.5-5.1) 


 


 





 


Chloride Level


 99 mmol/L


() 


 


 





 


Carbon Dioxide Level


 22 mmol/L


(21-32) 


 


 





 


Anion Gap 13 (6-14)    


 


Blood Urea Nitrogen 9 mg/dL (7-20)    


 


Creatinine


 0.8 mg/dL


(0.6-1.0) 


 


 





 


Estimated GFR


(Cockcroft-Gault) 67.7 


 


 


 





 


Glucose Level


 210 mg/dL


(70-99) 


 


 





 


Calcium Level


 8.4 mg/dL


(8.5-10.1) 


 


 





 


Glucose (Fingerstick)


 


 225 mg/dL


(70-99) 316 mg/dL


(70-99) 











Assessment/Plan


1.  recurrent syncope/near syncope - no remote alerts via [employer] on pacemaker 

for function or arrhythmias. 


2.  sick sinus syndrome s/p ppm - normal function as of Nov 15th download.  

Check today. 


3.  hx paf - <1% burden by most recent remote check 11/15.  


4.  hypertension - controlled.  check for orthostasis


5.  hyperlipidemia - check lipids


6.  diabetes mellitus type 2 uncontrolled


7.  renal insufficiency, mild likely secondary to dehydration








Suggest check orthostatics, IVF as per PCP, blood sugar control, Pacemaker check

, echo and carotid sonos.  Continue on tele.











KATERINA VALENZUELA APRN Nov 20, 2018 16:35

## 2018-11-21 VITALS — SYSTOLIC BLOOD PRESSURE: 120 MMHG | DIASTOLIC BLOOD PRESSURE: 63 MMHG

## 2018-11-21 VITALS — SYSTOLIC BLOOD PRESSURE: 108 MMHG | DIASTOLIC BLOOD PRESSURE: 63 MMHG

## 2018-11-21 VITALS — DIASTOLIC BLOOD PRESSURE: 68 MMHG | SYSTOLIC BLOOD PRESSURE: 110 MMHG

## 2018-11-21 LAB
ANION GAP SERPL CALC-SCNC: 10 MMOL/L (ref 6–14)
CA-I SERPL ISE-MCNC: 9 MG/DL (ref 7–20)
CALCIUM SERPL-MCNC: 8.6 MG/DL (ref 8.5–10.1)
CHLORIDE SERPL-SCNC: 104 MMOL/L (ref 98–107)
CHOLEST/HDLC SERPL: 5 {RATIO}
CO2 SERPL-SCNC: 22 MMOL/L (ref 21–32)
CREAT SERPL-MCNC: 0.8 MG/DL (ref 0.6–1)
ERYTHROCYTE [DISTWIDTH] IN BLOOD BY AUTOMATED COUNT: 12.7 % (ref 11.5–14.5)
GFR SERPLBLD BASED ON 1.73 SQ M-ARVRAT: 67.7 ML/MIN
GLUCOSE SERPL-MCNC: 208 MG/DL (ref 70–99)
HCT VFR BLD CALC: 33.2 % (ref 36–47)
HDLC SERPL-MCNC: 28 MG/DL (ref 40–60)
HGB BLD-MCNC: 11.4 G/DL (ref 12–15.5)
LDLC: 109 MG/DL (ref 0–100)
MCH RBC QN AUTO: 30 PG (ref 25–35)
MCHC RBC AUTO-ENTMCNC: 34 G/DL (ref 31–37)
MCV RBC AUTO: 88 FL (ref 79–100)
PLATELET # BLD AUTO: 155 X10^3/UL (ref 140–400)
POTASSIUM SERPL-SCNC: 4.2 MMOL/L (ref 3.5–5.1)
RBC # BLD AUTO: 3.76 X10^6/UL (ref 3.5–5.4)
SODIUM SERPL-SCNC: 136 MMOL/L (ref 136–145)
TRIGL SERPL-MCNC: 111 MG/DL (ref 0–150)
VLDLC: 22 MG/DL (ref 0–40)
WBC # BLD AUTO: 5.9 X10^3/UL (ref 4–11)

## 2018-11-21 RX ADMIN — INSULIN LISPRO SCH UNITS: 100 INJECTION, SOLUTION INTRAVENOUS; SUBCUTANEOUS at 08:54

## 2018-11-21 RX ADMIN — CLOPIDOGREL BISULFATE SCH MG: 75 TABLET ORAL at 08:50

## 2018-11-21 RX ADMIN — SODIUM CHLORIDE SCH MLS/HR: 0.9 INJECTION, SOLUTION INTRAVENOUS at 05:30

## 2018-11-21 RX ADMIN — APIXABAN SCH MG: 5 TABLET, FILM COATED ORAL at 08:50

## 2018-11-21 RX ADMIN — POTASSIUM CHLORIDE SCH MEQ: 1500 TABLET, EXTENDED RELEASE ORAL at 08:50

## 2018-11-21 RX ADMIN — METOPROLOL SUCCINATE SCH MG: 25 TABLET, EXTENDED RELEASE ORAL at 08:49

## 2018-11-21 RX ADMIN — GLIMEPIRIDE SCH MG: 2 TABLET ORAL at 08:49

## 2018-11-21 RX ADMIN — VITAMIN D, TAB 1000IU (100/BT) SCH UNIT: 25 TAB at 08:50

## 2018-11-21 NOTE — CARD
MR#: [id number]

Account#: [id number]

Accession#: [id number]

Date of Study: 11/21/2018

Ordering Physician: KATERINA VALENZUELA, 

Referring Physician: MARYLU BUSTOS, 

Tech: Dariela Haskins





--------------- APPROVED REPORT --------------





EXAM: Two-dimensional and M-mode echocardiogram with Doppler and color Doppler.



Other Information 

Quality : AverageHR: 67bpm



INDICATION

Syncope 



Surgery/Intervention

Pacemaker: 



RISK FACTORS

Diabetes



2D DIMENSIONS 

RVDd2.5 (2.9-3.5cm)Left Atrium(2D)2.6 (1.6-4.0cm)

IVSd0.9 (0.7-1.1cm)Aortic Root(2D)3.3 (2.0-3.7cm)

LVDd4.5 (3.9-5.9cm)LVOT Diameter2.1 (1.8-2.4cm)

PWd0.9 (0.7-1.1cm)LVDs2.8 (2.5-4.0cm)

FS (%) 37.5 %SV61.6 ml

LVEF(%)67.7 (>50%)



Aortic Valve

AoV Peak Johny.159.2cm/sAoV VTI31.5cm

AO Peak GR.10.1mmHgLVOT Peak Johny.98.5cm/s

LVOT  VTI 20.32cmAO Mean GR.6mmHg

NARCISO (VMAX)2.06es4GKP   (VTI)2.28cm2



Mitral Valve

MV E Vhwpigvg814.2cm/sMV DECEL [id number]

MV A Jlzpyawx89.9cm/sE/A  Ratio1.3



Pulmonary Valve

PV Peak Zgdidnmp32.2cm/sPV Peak Grad.4mmHg



Tricuspid Valve

TR P. Hdqyuvid740zi/sRAP ZNYMJUFM2zkVh

TR Peak Gr.80gdFgYCIU40ebUa



Pulmonary Vein

S1 Gnboaigj96.4cm/sD2 Edfjygsc26.8cm/s



 LEFT VENTRICLE 

The left ventricle is normal size. There is normal left ventricular wall thickness. The left ventricu
lar systolic function is normal. The Ejection Fraction is 60-65%. There is normal LV segmental wall m
otion. Transmitral Doppler flow pattern is normal for age.



 RIGHT VENTRICLE 

The right ventricle is normal size. There is normal right ventricular wall thickness. The right ventr
icular systolic function is normal.



 ATRIA 

The left atrium size is normal. The right atrium size is normal. The interatrial septum is intact wit
h no evidence for an atrial septal defect or patent foramen ovale as noted on 2-D or Doppler imaging.




 AORTIC VALVE 

The aortic valve is thickened but opens well. Doppler and Color Flow revealed mild aortic regurgitati
on. There is no significant aortic valvular stenosis.



 MITRAL VALVE 

The mitral valve is normal in structure and function. Doppler and Color-flow revealed trace mitral re
gurgitation.



 TRICUSPID VALVE 

The tricuspid valve is normal in structure and function. Doppler and Color Flow revealed trace tricus
pid regurgitation. There is no tricuspid valve stenosis. 



 PULMONIC VALVE 

The pulmonic valve is not well visualized. Doppler and Color Flow revealed mild pulmonic valvular reg
urgitation.



 GREAT VESSELS 

The aortic root is normal in size. Normal pulmonary venous flow (Doppler). The IVC is normal in size 
and collapses >50% with inspiration.



 PERICARDIAL EFFUSION 

There is no evidence of significant pericardial effusion.



Critical Notification

Critical Value: No



<Conclusion>

The left ventricular systolic function is normal.

The Ejection Fraction is 60-65%.

There is normal LV segmental wall motion.

Mild aortic regurgitation.

Trace mitral regurgitation.

Trace tricuspid regurgitation.

There is no evidence of significant pericardial effusion.



Signed by : Brendan Baez, 

Electronically Approved : 11/21/2018 11:16:57

## 2018-11-21 NOTE — PDOC
PROGRESS NOTES


Diagnosis


Problem


Problems


Medical Problems:


(1) Hyperglycemia


Status: Acute  





(2) Yeast UTI


Status: Acute  








Assessment


Problems


Medical Problems:


(1) Hyperglycemia


Status: Acute  





(2) Yeast UTI


Status: Acute  





1.  recurrent syncope/near syncope - no remote alerts via Merlin on pacemaker 

for function or arrhythmias , normal device check.  No significant orthostasis.

  ? autonomic dysfunction.  Syncope reportedly occurs ~every 6 months and has 

for years.  TEDS when out of bed. 


2.  sick sinus syndrome s/p ppm - normal function as of Nov 15th download.  

Normal function by in hosp check. 


3.  hx paf - <1%   


4.  hypertension - controlled.  no orthostasis.  


5.  hyperlipidemia - lipids pending


6.  diabetes mellitus type 2 uncontrolled


7.  renal insufficiency, mild likely secondary to dehydration, improved with 

fluids.





Mgmt of hyperglycemia and discharge plan per PCP.  Outpatient follow up as 

scheduled if no significant abn on echo.





Subjective


feeling better.  no lightheadedness, dyspnea, chest pain or recurrent syncope.


Objective





Vital Signs








  Date Time  Temp Pulse Resp B/P (MAP) Pulse Ox O2 Delivery O2 Flow Rate FiO2


 


11/21/18 08:49  72  120/63    


 


11/21/18 08:00      Room Air  


 


11/21/18 05:48   20  95   


 


11/21/18 05:45 98.4       














Intake and Output 


 


 11/21/18





 07:00


 


Intake Total 2164 ml


 


Balance 2164 ml


 


 


 


Intake Oral 1190 ml


 


IV Total 974 ml


 


# Voids 2


 


# Bowel Movements 5








Abdomen:  Normal bowel sounds


Heart:  Normal S1, Normal S2, Other (no significant murmurs, no gallops, clicks 

or rubs)


Extremities:  No cyanosis, No edema


General:  Alert, Oriented X3, Cooperative, No acute distress


HEENT:  Atraumatic, EOMI, Mucous membr. moist/pink


Lungs:  Clear to auscultation, Normal air movement


Neuro:  Normal speech, Strength at 5/5 X4 ext


Psych/Mental Status:  Mental status NL, Mood NL


Review of Relevant


I have reviewed the following items octaviano (where applicable) has been applied.


Labs





Laboratory Tests








Test


 11/19/18


13:39 11/19/18


13:50 11/19/18


15:00 11/19/18


15:49


 


Glucose (Fingerstick)


 534 mg/dL


(70-99) 


 


 302 mg/dL


(70-99)


 


White Blood Count


 


 7.1 x10^3/uL


(4.0-11.0) 


 





 


Red Blood Count


 


 4.09 x10^6/uL


(3.50-5.40) 


 





 


Hemoglobin


 


 12.5 g/dL


(12.0-15.5) 


 





 


Hematocrit


 


 36.5 %


(36.0-47.0) 


 





 


Mean Corpuscular Volume  89 fL ()   


 


Mean Corpuscular Hemoglobin  31 pg (25-35)   


 


Mean Corpuscular Hemoglobin


Concent 


 34 g/dL


(31-37) 


 





 


Red Cell Distribution Width


 


 13.0 %


(11.5-14.5) 


 





 


Platelet Count


 


 150 x10^3/uL


(140-400) 


 





 


Neutrophils (%) (Auto)  75 % (31-73)   


 


Lymphocytes (%) (Auto)  11 % (24-48)   


 


Monocytes (%) (Auto)  14 % (0-9)   


 


Eosinophils (%) (Auto)  0 % (0-3)   


 


Basophils (%) (Auto)  0 % (0-3)   


 


Neutrophils # (Auto)


 


 5.3 x10^3uL


(1.8-7.7) 


 





 


Lymphocytes # (Auto)


 


 0.8 x10^3/uL


(1.0-4.8) 


 





 


Monocytes # (Auto)


 


 1.0 x10^3/uL


(0.0-1.1) 


 





 


Eosinophils # (Auto)


 


 0.0 x10^3/uL


(0.0-0.7) 


 





 


Basophils # (Auto)


 


 0.0 x10^3/uL


(0.0-0.2) 


 





 


Sodium Level


 


 129 mmol/L


(136-145) 


 





 


Potassium Level


 


 3.5 mmol/L


(3.5-5.1) 


 





 


Chloride Level


 


 94 mmol/L


() 


 





 


Carbon Dioxide Level


 


 25 mmol/L


(21-32) 


 





 


Anion Gap  10 (6-14)   


 


Blood Urea Nitrogen


 


 14 mg/dL


(7-20) 


 





 


Creatinine


 


 1.2 mg/dL


(0.6-1.0) 


 





 


Estimated GFR


(Cockcroft-Gault) 


 42.4 


 


 





 


BUN/Creatinine Ratio  12 (6-20)   


 


Glucose Level


 


 500 mg/dL


(70-99) 


 





 


Calcium Level


 


 8.8 mg/dL


(8.5-10.1) 


 





 


Total Bilirubin


 


 1.2 mg/dL


(0.2-1.0) 


 





 


Aspartate Amino Transf


(AST/SGOT) 


 18 U/L (15-37) 


 


 





 


Alanine Aminotransferase


(ALT/SGPT) 


 22 U/L (14-59) 


 


 





 


Alkaline Phosphatase


 


 71 U/L


() 


 





 


Total Protein


 


 7.2 g/dL


(6.4-8.2) 


 





 


Albumin


 


 3.4 g/dL


(3.4-5.0) 


 





 


Albumin/Globulin Ratio  0.9 (1.0-1.7)   


 


Acetone Level  Neg (NEG)   


 


Urine Collection Type   Unknown  


 


Urine Color   Yellow  


 


Urine Clarity   Cloudy  


 


Urine pH   5.5  


 


Urine Specific Gravity   <=1.005  


 


Urine Protein


 


 


 Neg


(NEG-TRACE) 





 


Urine Glucose (UA)


 


 


 >=1000 mg/dL


(NEG) 





 


Urine Ketones (Stick)   15 mg/dL (NEG)  


 


Urine Blood   Small (NEG)  


 


Urine Nitrite   Neg (NEG)  


 


Urine Bilirubin   Neg (NEG)  


 


Urine Urobilinogen Dipstick


 


 


 0.2 mg/dL (0.2


mg/dL) 





 


Urine Leukocyte Esterase   Neg (NEG)  


 


Urine RBC   1-2 /HPF (0-2)  


 


Urine WBC


 


 


 20-40 /HPF


(0-4) 





 


Urine Squamous Epithelial


Cells 


 


 Mod /LPF 


 





 


Urine Bacteria


 


 


 Many /HPF


(0-FEW) 





 


Urine Yeast   Present /HPF  


 


Test


 11/19/18


16:57 11/19/18


17:33 11/19/18


20:26 11/20/18


04:50


 


Glucose (Fingerstick)


 226 mg/dL


(70-99) 217 mg/dL


(70-99) 368 mg/dL


(70-99) 





 


Nasal Screen MRSA (PCR)


 


 


 


 Negative


(Negative)


 


Test


 11/20/18


05:30 11/20/18


07:39 11/20/18


11:42 11/20/18


16:57


 


Sodium Level


 134 mmol/L


(136-145) 


 


 





 


Potassium Level


 3.1 mmol/L


(3.5-5.1) 


 


 





 


Chloride Level


 99 mmol/L


() 


 


 





 


Carbon Dioxide Level


 22 mmol/L


(21-32) 


 


 





 


Anion Gap 13 (6-14)    


 


Blood Urea Nitrogen 9 mg/dL (7-20)    


 


Creatinine


 0.8 mg/dL


(0.6-1.0) 


 


 





 


Estimated GFR


(Cockcroft-Gault) 67.7 


 


 


 





 


Glucose Level


 210 mg/dL


(70-99) 


 


 





 


Calcium Level


 8.4 mg/dL


(8.5-10.1) 


 


 





 


Glucose (Fingerstick)


 


 225 mg/dL


(70-99) 316 mg/dL


(70-99) 269 mg/dL


(70-99)


 


Test


 11/20/18


20:48 11/21/18


05:54 11/21/18


08:03 





 


Glucose (Fingerstick)


 261 mg/dL


(70-99) 


 198 mg/dL


(70-99) 





 


White Blood Count


 


 5.9 x10^3/uL


(4.0-11.0) 


 





 


Red Blood Count


 


 3.76 x10^6/uL


(3.50-5.40) 


 





 


Hemoglobin


 


 11.4 g/dL


(12.0-15.5) 


 





 


Hematocrit


 


 33.2 %


(36.0-47.0) 


 





 


Mean Corpuscular Volume  88 fL ()   


 


Mean Corpuscular Hemoglobin  30 pg (25-35)   


 


Mean Corpuscular Hemoglobin


Concent 


 34 g/dL


(31-37) 


 





 


Red Cell Distribution Width


 


 12.7 %


(11.5-14.5) 


 





 


Platelet Count


 


 155 x10^3/uL


(140-400) 


 





 


Erythrocyte Sedimentation Rate  67 (0-25)   


 


Sodium Level


 


 136 mmol/L


(136-145) 


 





 


Potassium Level


 


 4.2 mmol/L


(3.5-5.1) 


 





 


Chloride Level


 


 104 mmol/L


() 


 





 


Carbon Dioxide Level


 


 22 mmol/L


(21-32) 


 





 


Anion Gap  10 (6-14)   


 


Blood Urea Nitrogen  9 mg/dL (7-20)   


 


Creatinine


 


 0.8 mg/dL


(0.6-1.0) 


 





 


Estimated GFR


(Cockcroft-Gault) 


 67.7 


 


 





 


Glucose Level


 


 208 mg/dL


(70-99) 


 





 


Calcium Level


 


 8.6 mg/dL


(8.5-10.1) 


 





 


C-Reactive Protein


 


 146.5 mg/L


(0-3.3) 


 











Microbiology


11/19/18 Urine Culture - Preliminary, Resulted


           


11/19/18 Urine Culture Result 1 (ADONAY) - Preliminary, Resulted


Medications





Current Medications


Sodium Chloride 1,000 ml @  1,000 mls/hr 1X  ONCE IV  Last administered on 11/19 /18at 14:30;  Start 11/19/18 at 14:30;  Stop 11/19/18 at 15:29;  Status DC


Insulin Human Regular (HumuLIN R VIAL) 10 unit 1X  ONCE IV  Last administered 

on 11/19/18at 14:51;  Start 11/19/18 at 15:00;  Stop 11/19/18 at 15:01;  Status 

DC


Fluconazole (Diflucan) 150 mg 1X  ONCE PO  Last administered on 11/19/18at 15:57

;  Start 11/19/18 at 15:45;  Stop 11/19/18 at 15:46;  Status DC


Ondansetron HCl (Zofran) 4 mg PRN Q4HRS  PRN IV NAUSEA/VOMITING;  Start 11/19/ 18 at 16:00;  Stop 11/20/18 at 15:59;  Status DC


Vitamin D (Vitamin D3) 1,000 unit DAILY PO  Last administered on 11/21/18at 08:

50;  Start 11/20/18 at 09:00


Clopidogrel Bisulfate (Plavix) 75 mg DAILY PO  Last administered on 11/21/18at 

08:50;  Start 11/20/18 at 09:00


Metoprolol Succinate (Toprol Xl) 25 mg DAILY PO  Last administered on 11/21/ 18at 08:49;  Start 11/20/18 at 09:00


Apixaban (Eliquis) 5 mg BID PO  Last administered on 11/21/18at 08:50;  Start 11 /19/18 at 21:00


Insulin Human Lispro (HumaLOG) 0-5 UNITS TIDWMEALS SQ  Last administered on 11/ 21/18at 08:54;  Start 11/19/18 at 17:00


Dextrose 12.5 gm PRN Q15MIN  PRN IV SEE COMMENTS;  Start 11/19/18 at 17:45


Glimepiride (Amaryl) 1 mg 1X  ONCE PO  Last administered on 11/19/18at 18:10;  

Start 11/19/18 at 17:45;  Stop 11/19/18 at 17:46;  Status DC


Glimepiride (Amaryl) 2 mg DAILY PO  Last administered on 11/20/18at 08:22;  

Start 11/20/18 at 09:00;  Stop 11/20/18 at 14:36;  Status DC


Metformin HCl (Glucophage Xr) 500 mg DAILYWBKFT PO  Last administered on 11/20/ 18at 08:22;  Start 11/20/18 at 08:00;  Stop 11/20/18 at 14:36;  Status DC


Glimepiride (Amaryl) 2 mg BID PO  Last administered on 11/21/18at 08:49;  Start 

11/20/18 at 21:00


Metformin HCl (Glucophage Xr) 500 mg BID PO  Last administered on 11/21/18at 08:

49;  Start 11/20/18 at 21:00


Potassium Chloride (Klor-Con) 40 meq 1X  ONCE PO  Last administered on 11/20/ 18at 17:08;  Start 11/20/18 at 15:00;  Stop 11/20/18 at 15:01;  Status DC


Potassium Chloride (Klor-Con) 20 meq TID PO  Last administered on 11/21/18at 08:

50;  Start 11/20/18 at 15:00


Sodium Chloride 1,000 ml @  75 mls/hr [id number] IV  Last administered on 11/21/ 18at 05:30;  Start 11/20/18 at 14:45


Enoxaparin Sodium (Lovenox 40mg Syringe) 40 mg Q24H SQ ;  Start 11/20/18 at 14:

45;  Stop 11/20/18 at 14:51;  Status DC





Active Scripts


Active


Reported


Azo Cranberry Softgel (Cranberry Extract/Vit C) 1 Each Capsule 1 Each PO DAILY


Eliquis (Apixaban) 5 Mg Tablet 5 Mg PO BID


Toprol Xl (Metoprolol Succinate) 25 Mg Tab.er.24h 1 Tab PO DAILY


Vitamin D3 (Cholecalciferol (Vitamin D3)) 1,000 Unit Tablet 1 Tab PO DAILY


Plavix (Clopidogrel Bisulfate) 75 Mg Tablet 1 Tab PO DAILY


     LAST DOSE GIVEN:


     DATE: TODAY


     TIME: AM


     NEXT DOSE DUE:


     DATE: TOMORROW


     TIME: AM


Vitals/I & O





Vital Sign - Last 24 Hours








 11/20/18 11/20/18 11/20/18 11/20/18





 11:09 14:54 19:35 20:06


 


Temp 98.2 98.1  99.0


 


Pulse 68 94  73


 


Resp 18 20  18


 


B/P (MAP) 104/62 (76) 106/63 (77)  107/63 (78)


 


Pulse Ox 93 94  92


 


O2 Delivery Room Air Room Air Room Air Room Air


 


    





 11/20/18 11/20/18 11/20/18 11/21/18





 20:06 20:07 23:03 05:45


 


Temp   99.4 98.4


 


Pulse 74 83 81 64


 


Resp 20 20 18 20


 


B/P (MAP) 111/52 (71) 106/54 (71) 99/64 (76) 108/63 (78)


 


Pulse Ox 92  96 93


 


O2 Delivery Room Air Room Air Room Air Room Air


 


    





 11/21/18 11/21/18 11/21/18 11/21/18





 05:47 05:48 08:00 08:49


 


Pulse 79 72  72


 


Resp 20 20  


 


B/P (MAP) 110/68 (82) 120/63 (82)  120/63


 


Pulse Ox 94 95  


 


O2 Delivery Room Air Room Air Room Air 














Intake and Output   


 


 11/20/18 11/20/18 11/21/18





 15:00 23:00 07:00


 


Intake Total 600 ml 240 ml 1324 ml


 


Balance 600 ml 240 ml 1324 ml

















KATERINA VALENZUELA APRN Nov 21, 2018 11:15

## 2018-11-21 NOTE — DS
DATE OF DISCHARGE:  11/21/2018



HOSPITAL COURSE:  The patient is an 88-year-old  female patient who was

admitted with poorly controlled type 2 diabetes.  She apparently was treated

before with metformin immediate release and has lots of side effects and decided

not to take it.  She has also had multiple syncopal episodes and when she was

admitted, her blood sugar was more than 500, we did consult the Cardiology team

to interrogate her pacemaker and I did start her on Amaryl 2 mg twice a day as

well as metformin extended release 500 mg twice a day and her blood sugar is

somewhat better controlled, although not optimally yet.  The Cardiology team

basically did an echocardiogram, which showed that her left ventricular systolic

function is normal, ejection fraction was 60-65%.  She has normal left

ventricular segmental wall motion.  Pace wire noted in the right atrium and

right ventricle.  She has mild aortic regurgitation, trace mitral regurgitation,

trace tricuspid regurgitation.  There is no evidence of significant pericardial

effusion.  She had had recurrent syncope or near syncope.  There was no evidence

of remote alerts of her pacemaker for function or arrhythmia.  Her device was

checked and was normal.  No significant orthostatics.  She apparently has a

syncopal episode occurring every 6 months and that has been for years now and

PALLAVI hoses were suggested when out of bed and as she remained stable, has had no

further syncopal episode.  A decision was made to discharge her home to continue

with her current medication and to follow with her primary care physician, Dr. Alejo, to adjust her Amaryl and Glucophage.  Meanwhile, should also check

her blood sugar at least initially 4 times a day and eventually probably one

time a day will be sufficient.



PHYSICAL EXAMINATION:

GENERAL:  When I saw her today, she looked well and was clearly in no apparent

respiratory distress, slightly pale, but no jaundice, cyanosis, or thyromegaly. 

No jugular venous distension.  No limb edema.

VITAL SIGNS:  Her heart rate was 72, blood pressure 120/63, temperature was

98.4, respiratory rate 20, and oxygen saturation was 95%.

The rest of clinical examination is unremarkable.



LABORATORY DATA:  Her lab work this morning showed white cell count 5900,

hemoglobin 11, hematocrit 33, MCV 88, and platelet count of 155,000.  Her serum

sodium was 136, potassium 4.2, chloride 104, bicarbonate 22, anion gap of 10,

BUN 9, creatinine 0.9, estimated GFR was 67 mL per minute.  Her glucose was 198

and calcium was 8.6.  Her urinalysis was unremarkable and toxic screen was

negative.



DISCHARGE MEDICATIONS:  She was discharged home to continue on following

medications: glimepiride for Amaryl 2 mg twice a day, metformin extended release

500 mg twice a day, apixaban 5 mg twice a day, cholecalciferol for vitamin D3

1000 international units once a day, Plavix 75 mg once a day, cranberry extract

with vitamin C 1 tablet daily and metoprolol succinate for Toprol-XL 25 mg once

a day.



FINAL DISCHARGE DIAGNOSES:

Poorly controlled type 2 diabetes, much better controlled now, Amaryl and

metformin.



OTHER MEDICAL PROBLEMS:  Include acute kidney injury that resolved.  Her

creatinine came down from 1.2 to 0.8.  Hypokalemia, resolved.  Dilutional

hyponatremia, resolved.  Hypertension, atrial fibrillation, sick sinus syndrome,

generalized osteoarthritis, sensorineural deafness.





______________________________

MARYLU BUSTOS MD



DR:  MARIE/shantelle  JOB#:  [id number] / [id number]

DD:  11/21/2018 11:34  DT:  11/21/2018 14:08

## 2019-01-07 ENCOUNTER — HOSPITAL ENCOUNTER (INPATIENT)
Dept: HOSPITAL 63 - ER | Age: 84
LOS: 1 days | Discharge: ANOTHER HOSPITAL | DRG: 872 | End: 2019-01-08
Attending: INTERNAL MEDICINE | Admitting: INTERNAL MEDICINE
Payer: MEDICARE

## 2019-01-07 VITALS — DIASTOLIC BLOOD PRESSURE: 72 MMHG | SYSTOLIC BLOOD PRESSURE: 143 MMHG

## 2019-01-07 VITALS — SYSTOLIC BLOOD PRESSURE: 126 MMHG | DIASTOLIC BLOOD PRESSURE: 67 MMHG

## 2019-01-07 VITALS — WEIGHT: 137.44 LBS | HEIGHT: 63 IN | BODY MASS INDEX: 24.35 KG/M2

## 2019-01-07 VITALS — DIASTOLIC BLOOD PRESSURE: 63 MMHG | SYSTOLIC BLOOD PRESSURE: 120 MMHG

## 2019-01-07 DIAGNOSIS — Z90.49: ICD-10-CM

## 2019-01-07 DIAGNOSIS — N39.0: ICD-10-CM

## 2019-01-07 DIAGNOSIS — Z95.0: ICD-10-CM

## 2019-01-07 DIAGNOSIS — E78.00: ICD-10-CM

## 2019-01-07 DIAGNOSIS — Z82.0: ICD-10-CM

## 2019-01-07 DIAGNOSIS — I48.91: ICD-10-CM

## 2019-01-07 DIAGNOSIS — Z82.5: ICD-10-CM

## 2019-01-07 DIAGNOSIS — Z98.42: ICD-10-CM

## 2019-01-07 DIAGNOSIS — Z90.710: ICD-10-CM

## 2019-01-07 DIAGNOSIS — Z88.8: ICD-10-CM

## 2019-01-07 DIAGNOSIS — Z86.73: ICD-10-CM

## 2019-01-07 DIAGNOSIS — Z98.41: ICD-10-CM

## 2019-01-07 DIAGNOSIS — I10: ICD-10-CM

## 2019-01-07 DIAGNOSIS — E11.9: ICD-10-CM

## 2019-01-07 DIAGNOSIS — H90.5: ICD-10-CM

## 2019-01-07 DIAGNOSIS — A41.9: Primary | ICD-10-CM

## 2019-01-07 DIAGNOSIS — Z80.1: ICD-10-CM

## 2019-01-07 DIAGNOSIS — M15.9: ICD-10-CM

## 2019-01-07 DIAGNOSIS — Z90.721: ICD-10-CM

## 2019-01-07 LAB
ALBUMIN SERPL-MCNC: 4.1 G/DL (ref 3.4–5)
ALBUMIN/GLOB SERPL: 1.1 {RATIO} (ref 1–1.7)
ALP SERPL-CCNC: 63 U/L (ref 46–116)
ALT SERPL-CCNC: 18 U/L (ref 14–59)
ANION GAP SERPL CALC-SCNC: 13 MMOL/L (ref 6–14)
APTT PPP: YELLOW S
AST SERPL-CCNC: 14 U/L (ref 15–37)
BACTERIA #/AREA URNS HPF: (no result) /HPF
BASOPHILS # BLD AUTO: 0 X10^3/UL (ref 0–0.2)
BASOPHILS NFR BLD: 1 % (ref 0–3)
BILIRUB SERPL-MCNC: 0.5 MG/DL (ref 0.2–1)
BILIRUB UR QL STRIP: (no result)
BUN/CREAT SERPL: 13 (ref 6–20)
CA-I SERPL ISE-MCNC: 13 MG/DL (ref 7–20)
CALCIUM SERPL-MCNC: 9.3 MG/DL (ref 8.5–10.1)
CHLORIDE SERPL-SCNC: 103 MMOL/L (ref 98–107)
CO2 SERPL-SCNC: 24 MMOL/L (ref 21–32)
CREAT SERPL-MCNC: 1 MG/DL (ref 0.6–1)
EOSINOPHIL NFR BLD: 0.1 X10^3/UL (ref 0–0.7)
EOSINOPHIL NFR BLD: 1 % (ref 0–3)
ERYTHROCYTE [DISTWIDTH] IN BLOOD BY AUTOMATED COUNT: 14.7 % (ref 11.5–14.5)
FIBRINOGEN PPP-MCNC: (no result) MG/DL
GFR SERPLBLD BASED ON 1.73 SQ M-ARVRAT: 52.3 ML/MIN
GLOBULIN SER-MCNC: 3.6 G/DL (ref 2.2–3.8)
GLUCOSE SERPL-MCNC: 172 MG/DL (ref 70–99)
GLUCOSE UR STRIP-MCNC: (no result) MG/DL
HCT VFR BLD CALC: 41 % (ref 36–47)
HGB BLD-MCNC: 13.9 G/DL (ref 12–15.5)
LYMPHOCYTES # BLD: 1.1 X10^3/UL (ref 1–4.8)
LYMPHOCYTES NFR BLD AUTO: 23 % (ref 24–48)
MAGNESIUM SERPL-MCNC: 2.1 MG/DL (ref 1.8–2.4)
MCH RBC QN AUTO: 31 PG (ref 25–35)
MCHC RBC AUTO-ENTMCNC: 34 G/DL (ref 31–37)
MCV RBC AUTO: 90 FL (ref 79–100)
MONO #: 0.6 X10^3/UL (ref 0–1.1)
MONOCYTES NFR BLD: 11 % (ref 0–9)
NEUT #: 3.1 X10^3UL (ref 1.8–7.7)
NEUTROPHILS NFR BLD AUTO: 64 % (ref 31–73)
NITRITE UR QL STRIP: (no result)
PLATELET # BLD AUTO: 189 X10^3/UL (ref 140–400)
POTASSIUM SERPL-SCNC: 4.3 MMOL/L (ref 3.5–5.1)
PROT SERPL-MCNC: 7.7 G/DL (ref 6.4–8.2)
RBC # BLD AUTO: 4.56 X10^6/UL (ref 3.5–5.4)
RBC #/AREA URNS HPF: 0 /HPF (ref 0–2)
SODIUM SERPL-SCNC: 140 MMOL/L (ref 136–145)
SP GR UR STRIP: 1.01
SQUAMOUS #/AREA URNS LPF: (no result) /LPF
UROBILINOGEN UR-MCNC: 0.2 MG/DL
WBC # BLD AUTO: 4.9 X10^3/UL (ref 4–11)

## 2019-01-07 PROCEDURE — 93005 ELECTROCARDIOGRAM TRACING: CPT

## 2019-01-07 PROCEDURE — 71045 X-RAY EXAM CHEST 1 VIEW: CPT

## 2019-01-07 PROCEDURE — 85025 COMPLETE CBC W/AUTO DIFF WBC: CPT

## 2019-01-07 PROCEDURE — 81001 URINALYSIS AUTO W/SCOPE: CPT

## 2019-01-07 PROCEDURE — 83880 ASSAY OF NATRIURETIC PEPTIDE: CPT

## 2019-01-07 PROCEDURE — 83735 ASSAY OF MAGNESIUM: CPT

## 2019-01-07 PROCEDURE — 84484 ASSAY OF TROPONIN QUANT: CPT

## 2019-01-07 PROCEDURE — 80053 COMPREHEN METABOLIC PANEL: CPT

## 2019-01-07 PROCEDURE — 70450 CT HEAD/BRAIN W/O DYE: CPT

## 2019-01-07 PROCEDURE — 85027 COMPLETE CBC AUTOMATED: CPT

## 2019-01-07 PROCEDURE — 36415 COLL VENOUS BLD VENIPUNCTURE: CPT

## 2019-01-07 PROCEDURE — 82947 ASSAY GLUCOSE BLOOD QUANT: CPT

## 2019-01-07 PROCEDURE — 87086 URINE CULTURE/COLONY COUNT: CPT

## 2019-01-07 PROCEDURE — 96365 THER/PROPH/DIAG IV INF INIT: CPT

## 2019-01-07 PROCEDURE — 83605 ASSAY OF LACTIC ACID: CPT

## 2019-01-07 PROCEDURE — 73521 X-RAY EXAM HIPS BI 2 VIEWS: CPT

## 2019-01-07 PROCEDURE — 82550 ASSAY OF CK (CPK): CPT

## 2019-01-07 RX ADMIN — SODIUM CHLORIDE SCH MLS/HR: 0.9 INJECTION, SOLUTION INTRAVENOUS at 15:25

## 2019-01-07 RX ADMIN — APIXABAN SCH MG: 5 TABLET, FILM COATED ORAL at 21:36

## 2019-01-07 NOTE — EKG
[facility] [address], [place] [address]

Test Date:    2019               Test Time:    11:36:06

Pat Name:     SILVINO REYNOSO               Department:   

Patient ID:   [id number]           Room:          

Gender:       F                        Technician:   BOSTON

:          1930               Requested By: BRYAN FLOOD

Order Number: [id number]            Reading MD:     

                                 Measurements

Intervals                              Axis          

Rate:         0                        P:            

NH:                                    QRS:          0

QRSD:         0                        T:            0

QT:           0                                      

QTc:          0                                      

                           Interpretive Statements

SINUS RHYTHM

LEFTWARD AXIS

OTHERWISE NORMAL ECG

RI6.01          Unconfirmed report

No previous ECG available for comparison

## 2019-01-07 NOTE — RAD
Examination: HIP BILATERAL WITH PELVIS

 

History: Pelvic pain and right posterior hip pain, weakness

 

Comparison/Correlation: 11/11/2016 CT abdomen and pelvis without contrast

 

Findings: Frontal view of the pelvis was obtained. Frog-leg lateral view 

of the right hip and frog-leg lateral view of the left hip were obtained.

 

Hip joints are symmetric with spurring of the acetabulum bilaterally 

noted. Sacroiliac joints are symmetric. No acute fracture or bony 

destructive change.

 

 

Impression:

No suspicious process. Consider further evaluation if occult fracture is a

persistent concern.

 

Electronically signed by: Shahab Layne MD (1/7/2019 11:49 PM) [facility]

## 2019-01-07 NOTE — RAD
EXAM: CHEST 1 VIEW 

 

History: Confusion 

 

COMPARISON: 10/4/2017

 

TECHNIQUE: Single portable radiograph of the chest

 

FINDINGS:  The cardiac silhouette is unremarkable. The lungs are clear 

bilaterally. The costophrenic sulci are clear and well demarcated. 

Left-sided cardiac pacer is unchanged.

 

IMPRESSION:  No radiographic evidence of an acute cardiopulmonary process.

 

 

 

Electronically signed by: José Lee MD (1/7/2019 12:04 PM) [facility]

## 2019-01-07 NOTE — HP
ADMIT DATE:  2019



HISTORY OF PRESENT ILLNESS:  The patient is an 88-year-old  female

patient, who came to the Emergency Room, complaining right leg weakness that

started about 5 days ago.  She did complain that she has constant right leg

weakness without pain, edema, erythema, or focal neurological deficit.  She

denied any headache, nausea, vomiting, blurring of vision, tingling and

numbness.  The patient's daughter stated that she was more lethargic than her

usual, did not want to eat and she was brought to the Emergency Room where she

was extensively investigated.  She had had a CT scan of the head, which was

basically unremarkable and her lab work also showed that she has urinary tract

infection and was admitted for the treatment of urinary tract infection to

investigate her further.



PAST MEDICAL HISTORY:  Significant for hypertension, atrial fibrillation, sick

sinus syndrome, generalized osteoarthritis, sensorineural deafness and type 2

diabetes.



PAST SURGICAL HISTORY:  Significant for total abdominal hysterectomy, bilateral

cataract extraction, appendectomy, partial colon resection, permanent pacemaker

placement and breast reduction surgery.



ALLERGIES:  She is allergic to ASPIRIN.



MEDICATIONS:  She is currently on following medications:  She is on apixaban 5

mg twice a day, Plavix 75 mg once a day, metoprolol succinate 25 mg daily,

glimepiride 2 mg with supper and glimepiride 4 mg with breakfast,

cholecalciferol, vitamin D3 1000 International Unit once a day, and cranberry

extract with vitamin C 1 tablet daily.



FAMILY HISTORY:  Strongly positive for lung cancer in her brother and sisters. 

Her father  at the age of 60 because of emphysema and mother  in her 80s

because of Alzheimer's disease.



SOCIAL HISTORY:  She is .  Her daughter lives with her.  She has 3 living

daughters and one of her younger daughter  of a neurodegenerative disease. 

She has never smoked, does not drink alcohol.  She used to be a 

and currently at [employer] .



REVIEW OF SYSTEMS:  The patient denied any blurring of vision.  She has

bilateral cataract extraction, but denied any glaucoma or macular degeneration. 

Denied any earache, tinnitus or sensorineural deafness.  Denied any nosebleeds,

stuffy nose or postnasal drip.  Denied any sore throat, sore tongue, toothache,

hoarseness of voice or difficulty swallowing.  Denied any nausea, vomiting,

diarrhea or constipation.  Denied any hematemesis, melena or hematochezia. 

Denied any dysuria, frequency or hematuria.  Denied any chest pain, shortness of

breath, orthopnea, paroxysmal nocturnal dyspnea.  Denied any dizziness,

lightheadedness, or vertigo.  Her only complaint was a right leg weakness issue

that her hip joint gives way when she walks and this happening about 5 days ago.

 Denied any loss of bladder or bowel control.  Denied any tingling or numbness. 

Denied actual weakness, she just feels that her hip giving way while in the

Emergency Room.



PHYSICAL EXAMINATION:

GENERAL:  On arrival, the patient looked well and was clearly in no apparent

respiratory distress, slightly pale, but no jaundice, cyanosis, or thyromegaly. 

No jugular venous distension.  No limb edema.

VITAL SIGNS:  Her heart rate was 81, blood pressure 119/85, temperature was

97.6, respiratory rate was 18 and oxygen saturation was 99% on room air.

HEAD, EYES, EARS, NOSE, AND THROAT:  Showed normocephalic, atraumatic.

NECK:  Supple.

HEART:  Showed normal first and second heart sounds.  No gallop, rub or murmur.

CHEST:  Clear to auscultation.  No crepitation or rhonchi.

ABDOMEN:  Distended, soft, nontender.  No guarding or rigidity.  No

organomegaly.  All hernial orifices intact.  Bowel sounds normal.

NEUROLOGIC:  She is awake, alert, responding appropriately.  All cranial nerves

intact.  She moves extremities without difficulty.  She was able to ambulate to

the bathroom this afternoon without difficulty.



LABORATORY DATA:  On arrival showed a white cell count 4900, hemoglobin 13.9,

hematocrit 41, MCV 90, and platelet count of 189,000 with normal manual

differential.  Her serum sodium was 140, potassium 4.3, chloride 103,

bicarbonate 24, anion gap 13, BUN 13, creatinine 1, estimated GFR was 52 mL per

minute.  Her glucose was 172.  Lactic acid was 2.8, calcium was 9.3, magnesium

2.1.  Total bilirubin, AST, ALT, alkaline phosphatase were normal.  Her total

protein was 7.7, albumin was 4.1.  She has had a CT scan of the head, which

basically showed mild bilateral periventricular white matter hypodensities,

likely chronic small vessel ischemic disease.  She has no evidence of acute

intracranial hemorrhage, no extraaxial fluid collection, no mass effect or

midline shift, ventricular size is appropriate.  Basilar cisterns are patent. 

No fracture identified.  Grey and white matter differentiation is preserved. 

Globes and orbits are within normal limits.  Paranasal sinuses and mastoid air

cells are well aerated.  She has atherosclerotic calcification identified in the

right vertebral artery.



IMPRESSION AND PLAN:  No acute intracranial finding.  Her urinalysis showed the

urine was positive for nitrite.  There was moderate amount of leukocyte esterase

with 11-20 wbc's.  The patient was admitted and was started on IV ceftriaxone. 

We will continue with all her other medications.  We will get physical and

occupational therapy.  I will also arrange for her to have x-ray of her pelvis,

both hip joints and we will decide the further management accordingly.





______________________________

MARYLU BUSTOS MD



DR:  MARIE/shantelle  JOB#:  [id number] / [id number]

DD:  2019 17:31  DT:  2019 19:14

## 2019-01-07 NOTE — PHYS DOC
Past History


Past Medical History:  A-Fib, Diabetes, High Cholesterol, Hypertension, Other


Past Surgical History:  Colectomy, Hysterectomy, Oophorectomy, Pacemaker


Alcohol Use:  None


Drug Use:  None





Adult General


Chief Complaint


Chief Complaint:  WEAKNESS/GENERALIZED





HPI


HPI





Patient is a 88 year old female brought in by EMS because of right leg weakness 

since yesterday. Patient complaining of constant right leg weakness without pain

, edema, erythema, focal neuro deficit. She denies headache, nausea and vomiting

, chest pain, shortness of breath, fever and chills, urinary symptoms, focal 

neuro deficit. Patient's daughter started states she was more lethargic than 

her usual today and did not want to eat.





Review of Systems


Review of Systems





Constitutional: Denies fever or chills []


Eyes: Denies change in visual acuity, redness, or eye pain []


HENT: Denies nasal congestion or sore throat []


Respiratory: Denies cough or shortness of breath []


Cardiovascular: No additional information not addressed in HPI []


GI: Denies abdominal pain, nausea, vomiting, bloody stools or diarrhea []


: Denies dysuria or hematuria []


Musculoskeletal: Denies back pain or joint pain []


Integument: Denies rash or skin lesions []


Neurologic: Denies headache,  sensory changes, reports focal weakness []


Endocrine: Denies polyuria or polydipsia []





All other systems were reviewed and found to be within normal limits, except as 

documented in this note.





Current Medications


Current Medications





Current Medications








 Medications


  (Trade)  Dose


 Ordered  Sig/Rosetta  Start Time


 Stop Time Status Last Admin


Dose Admin


 


 Ceftriaxone


 Sodium 1 gm/


 Sodium Chloride  50 ml @ 


 100 mls/hr  1X  ONCE  19 13:00


 19 13:29   





 


 Sodium Chloride  1,000 ml @ 


 1,000 mls/hr  1X  ONCE  19 13:00


 19 13:59   














Allergies


Allergies





Allergies








Coded Allergies Type Severity Reaction Last Updated Verified


 


  aspirin Allergy Intermediate  12/10/14 Yes


 


  I S O L A T I O N *CONTACT* Allergy Unknown  7/13/15 No











Physical Exam


Physical Exam





Constitutional: Well developed, mild distress, non-toxic appearance. []


HENT: Normocephalic, atraumatic, oropharynx moist, no oral exudates, nose 

normal. []


Eyes: PERRLA, EOMI, conjunctiva normal, no discharge. [] 


Neck: Normal range of motion, no tenderness, supple, no stridor. [] 


Cardiovascular:Heart rate regular rhythm, no murmur []


Lungs & Thorax:  Bilateral breath sounds clear to auscultation []


Abdomen: Bowel sounds normal, soft, no tenderness, no masses, no pulsatile 

masses. [] 


Skin: Warm, dry, no erythema, no rash. [] 


Back: No tenderness, no CVA tenderness. [] 


Extremities: Right lower extremity without weakness or focal neuro deficit or 

sign of injury, no tenderness, no cyanosis, no clubbing, ROM intact, no edema. [

] 


Neurologic: Alert and oriented X 2, normal motor function, normal sensory 

function, no focal deficits noted. []


Psychologic: Affect normal, judgement normal, mood normal. []





Current Patient Data


Vital Signs





 Vital Signs








  Date Time  Temp Pulse Resp B/P (MAP) Pulse Ox O2 Delivery O2 Flow Rate FiO2


 


19 12:39  63 18 119/85 (96) 98 Room Air  


 


19 11:10 97.6       








Lab Results





 Laboratory Tests








Test


 19


11:24 19


11:40 19


12:15


 


White Blood Count


 4.9 x10^3/uL


(4.0-11.0) 


 





 


Red Blood Count


 4.56 x10^6/uL


(3.50-5.40) 


 





 


Hemoglobin


 13.9 g/dL


(12.0-15.5) 


 





 


Hematocrit


 41.0 %


(36.0-47.0) 


 





 


Mean Corpuscular Volume


 90 fL ()


 


 





 


Mean Corpuscular Hemoglobin 31 pg (25-35)    


 


Mean Corpuscular Hemoglobin


Concent 34 g/dL


(31-37) 


 





 


Red Cell Distribution Width


 14.7 %


(11.5-14.5)  H 


 





 


Platelet Count


 189 x10^3/uL


(140-400) 


 





 


Neutrophils (%) (Auto) 64 % (31-73)    


 


Lymphocytes (%) (Auto) 23 % (24-48)  L  


 


Monocytes (%) (Auto) 11 % (0-9)  H  


 


Eosinophils (%) (Auto) 1 % (0-3)    


 


Basophils (%) (Auto) 1 % (0-3)    


 


Neutrophils # (Auto)


 3.1 x10^3uL


(1.8-7.7) 


 





 


Lymphocytes # (Auto)


 1.1 x10^3/uL


(1.0-4.8) 


 





 


Monocytes # (Auto)


 0.6 x10^3/uL


(0.0-1.1) 


 





 


Eosinophils # (Auto)


 0.1 x10^3/uL


(0.0-0.7) 


 





 


Basophils # (Auto)


 0.0 x10^3/uL


(0.0-0.2) 


 





 


Sodium Level


 140 mmol/L


(136-145) 


 





 


Potassium Level


 4.3 mmol/L


(3.5-5.1) 


 





 


Chloride Level


 103 mmol/L


() 


 





 


Carbon Dioxide Level


 24 mmol/L


(21-32) 


 





 


Anion Gap 13 (6-14)    


 


Blood Urea Nitrogen


 13 mg/dL


(7-20) 


 





 


Creatinine


 1.0 mg/dL


(0.6-1.0) 


 





 


Estimated GFR


(Cockcroft-Gault) 52.3  


 


 





 


BUN/Creatinine Ratio 13 (6-20)    


 


Glucose Level


 172 mg/dL


(70-99)  H 


 





 


Calcium Level


 9.3 mg/dL


(8.5-10.1) 


 





 


Magnesium Level


 2.1 mg/dL


(1.8-2.4) 


 





 


Total Bilirubin


 0.5 mg/dL


(0.2-1.0) 


 





 


Aspartate Amino Transferase


(AST) 14 U/L (15-37)


L 


 





 


Alanine Aminotransferase (ALT)


 18 U/L (14-59)


 


 





 


Alkaline Phosphatase


 63 U/L


() 


 





 


Creatine Kinase


 34 U/L


() 


 





 


Troponin I Quantitative


 < 0.017 ng/mL


(0-0.055) 


 





 


NT-Pro-B-Type Natriuretic


Peptide 116 pg/mL


(0-449) 


 





 


Total Protein


 7.7 g/dL


(6.4-8.2) 


 





 


Albumin


 4.1 g/dL


(3.4-5.0) 


 





 


Albumin/Globulin Ratio 1.1 (1.0-1.7)    


 


Lactic Acid Level


 


 2.8 mmol/L


(0.4-2.0)  H 





 


Urine Collection Type   Unknown  


 


Urine Color   Yellow  


 


Urine Clarity   Turbid  


 


Urine pH   6.0  


 


Urine Specific Gravity   1.010  


 


Urine Protein


 


 


 Neg


(NEG-TRACE)


 


Urine Glucose (UA)


 


 


 Neg mg/dL


(NEG)


 


Urine Ketones (Stick)


 


 


 Neg mg/dL


(NEG)


 


Urine Blood   Neg (NEG)  


 


Urine Nitrite   Pos (NEG)  


 


Urine Bilirubin   Neg (NEG)  


 


Urine Urobilinogen Dipstick


 


 


 0.2 mg/dL (0.2


mg/dL)


 


Urine Leukocyte Esterase   Mod (NEG)  


 


Urine RBC   0 /HPF (0-2)  


 


Urine WBC


 


 


 11-20 /HPF


(0-4)


 


Urine Squamous Epithelial


Cells 


 


 Few /LPF  





 


Urine Bacteria


 


 


 Many /HPF


(0-FEW)











EKG


EKG


EKG interpreted by me. EKG at 1136 showed normal sinus rhythm at rate of 61, 

left white axis, otherwise normal EKG[]





Radiology/Procedures


Radiology/Procedures


 [facility] [address], [place] [address] [phone]


 


 IMAGING REPORT





 Signed





PATIENT: SILVINO REYNOSO ACCOUNT: [id number] MRN#: [id number]


: 1930 LOCATION: ER AGE: 88


SEX: F EXAM DT: 19 ACCESSION#: [id number]


STATUS: REG ER ORD. PHYSICIAN: BRYAN FLOOD MD 


REASON: confusion


PROCEDURE: PORTABLE CHEST 1V





 


EXAM: CHEST 1 VIEW 


 


History: Confusion 


 


COMPARISON: 10/4/2017


 


TECHNIQUE: Single portable radiograph of the chest


 


FINDINGS:  The cardiac silhouette is unremarkable. The lungs are clear 


bilaterally. The costophrenic sulci are clear and well demarcated. 


Left-sided cardiac pacer is unchanged.


 


IMPRESSION:  No radiographic evidence of an acute cardiopulmonary process.


 


 


 


Electronically signed by: José Lee MD (2019 12:04 PM) [facility]














DICTATED AND SIGNED BY:     JOSÉ LEE MD


DATE:     19 1202





CC: BRYAN FLOOD MD; DOUG SINHA MD ~


 [facility] [address], [place] [address] [phone]


 


 IMAGING REPORT





 Signed





PATIENT: SILVINO REYNOSO ACCOUNT: [id number] MRN#: [id number]


: 1930 LOCATION: ER AGE: 88


SEX: F EXAM DT: 19 ACCESSION#: [id number]


STATUS: REG ER ORD. PHYSICIAN: BRYAN FLOOD MD 


REASON: confusion


PROCEDURE: CT HEAD WO CONTRAST





 


CT HEAD


 


INDICATION: CONFUSION


 


COMPARISON: 2017


 


Exposure: One or more of the following individualized dose reduction 


techniques were utilized for this examination:  1. Automated exposure 


control  2. Adjustment of the mA and/or kV according to patient size  3. 


Use of iterative reconstruction technique


 


TECHNIQUE: 5 mm contiguous axial images were obtained from the skull base 


to the vertex in both bone and soft tissue algorithm.  


 


FINDINGS: 


 


Mild bilateral periventricular white matter hypodensities likely chronic 


small vessel ischemic disease.  


 


No evidence of acute intracranial hemorrhage.   No extra-axial fluid 


collections.


 


No mass effect or midline shift. Ventricular size is appropriate.  Basal 


cisterns are patent.


 


No fractures identified.Gray-white differentiation is preserved.Globes and


orbits are within normal limits.   Paranasal sinuses and mastoid air cells


are clear. Atherosclerotic calcifications identified in the right 


vertebral artery. 


 


IMPRESSION:


 


No acute intracranial findings. 


 


Electronically signed by: José Lee MD (2019 12:01 PM) [facility]














DICTATED AND SIGNED BY:     JOSÉ LEE MD


DATE:     19 1159





CC: BRYAN FLOOD MD; DOUG SINHA MD ~





Course & Med Decision Making


Course & Med Decision Making


Pertinent Labs and Imaging studies reviewed. (See chart for details)





Evaluation of patient in ER showed 88-year-old female patient brought in by EMS 

because of right lower extremity weakness since yesterday and confusion. 

Patient was alert and oriented 2 as her baseline condition according to her 

daughter. Labs showed elevation of lactic acid and UTI. Patient treated with IV 

fluid and antibiotic in ER. Dr. Gan accepted admission at 1318.





Dragon Disclaimer


Dragon Disclaimer


This electronic medical record was generated, in whole or in part, using a 

voice recognition dictation system.





Departure


Departure:


Impression:  


 Primary Impression:  


 Sepsis


 Additional Impressions:  


 Urinary tract infection


 Right leg weakness


Disposition:  09 ADMITTED AS INPATIENT (at 1320)


Admitting Physician:  Misbah Gan (accepted admission at 1318)


Condition:  IMPROVED


Referrals:  


DOUG SINHA MD (PCP)





Problem Qualifiers











BRYAN FLOOD MD 2019 13:21

## 2019-01-07 NOTE — RAD
CT HEAD

 

INDICATION: CONFUSION

 

COMPARISON: 2/5/2017

 

Exposure: One or more of the following individualized dose reduction 

techniques were utilized for this examination:  1. Automated exposure 

control  2. Adjustment of the mA and/or kV according to patient size  3. 

Use of iterative reconstruction technique

 

TECHNIQUE: 5 mm contiguous axial images were obtained from the skull base 

to the vertex in both bone and soft tissue algorithm.  

 

FINDINGS: 

 

Mild bilateral periventricular white matter hypodensities likely chronic 

small vessel ischemic disease.  

 

No evidence of acute intracranial hemorrhage.   No extra-axial fluid 

collections.

 

No mass effect or midline shift. Ventricular size is appropriate.  Basal 

cisterns are patent.

 

No fractures identified.Gray-white differentiation is preserved.Globes and

orbits are within normal limits.   Paranasal sinuses and mastoid air cells

are clear. Atherosclerotic calcifications identified in the right 

vertebral artery. 

 

IMPRESSION:

 

No acute intracranial findings. 

 

Electronically signed by: José Lee MD (1/7/2019 12:01 PM) [facility]

## 2019-01-08 VITALS — DIASTOLIC BLOOD PRESSURE: 75 MMHG | SYSTOLIC BLOOD PRESSURE: 127 MMHG

## 2019-01-08 VITALS — SYSTOLIC BLOOD PRESSURE: 113 MMHG | DIASTOLIC BLOOD PRESSURE: 69 MMHG

## 2019-01-08 LAB
ALBUMIN SERPL-MCNC: 3.4 G/DL (ref 3.4–5)
ALBUMIN/GLOB SERPL: 1.1 {RATIO} (ref 1–1.7)
ALP SERPL-CCNC: 53 U/L (ref 46–116)
ALT SERPL-CCNC: 16 U/L (ref 14–59)
ANION GAP SERPL CALC-SCNC: 10 MMOL/L (ref 6–14)
AST SERPL-CCNC: 14 U/L (ref 15–37)
BILIRUB SERPL-MCNC: 0.4 MG/DL (ref 0.2–1)
BUN/CREAT SERPL: 12 (ref 6–20)
CA-I SERPL ISE-MCNC: 11 MG/DL (ref 7–20)
CALCIUM SERPL-MCNC: 8.6 MG/DL (ref 8.5–10.1)
CHLORIDE SERPL-SCNC: 107 MMOL/L (ref 98–107)
CO2 SERPL-SCNC: 24 MMOL/L (ref 21–32)
CREAT SERPL-MCNC: 0.9 MG/DL (ref 0.6–1)
ERYTHROCYTE [DISTWIDTH] IN BLOOD BY AUTOMATED COUNT: 14.2 % (ref 11.5–14.5)
GFR SERPLBLD BASED ON 1.73 SQ M-ARVRAT: 59.1 ML/MIN
GLOBULIN SER-MCNC: 3.1 G/DL (ref 2.2–3.8)
GLUCOSE SERPL-MCNC: 112 MG/DL (ref 70–99)
HCT VFR BLD CALC: 36 % (ref 36–47)
HGB BLD-MCNC: 12.5 G/DL (ref 12–15.5)
MCH RBC QN AUTO: 31 PG (ref 25–35)
MCHC RBC AUTO-ENTMCNC: 35 G/DL (ref 31–37)
MCV RBC AUTO: 89 FL (ref 79–100)
PLATELET # BLD AUTO: 158 X10^3/UL (ref 140–400)
POTASSIUM SERPL-SCNC: 3.9 MMOL/L (ref 3.5–5.1)
PROT SERPL-MCNC: 6.5 G/DL (ref 6.4–8.2)
RBC # BLD AUTO: 4.05 X10^6/UL (ref 3.5–5.4)
SODIUM SERPL-SCNC: 141 MMOL/L (ref 136–145)
WBC # BLD AUTO: 4.1 X10^3/UL (ref 4–11)

## 2019-01-08 RX ADMIN — APIXABAN SCH MG: 5 TABLET, FILM COATED ORAL at 08:09

## 2019-01-08 RX ADMIN — SODIUM CHLORIDE SCH MLS/HR: 0.9 INJECTION, SOLUTION INTRAVENOUS at 03:30

## 2019-01-08 NOTE — DS
DATE OF DISCHARGE:  01/07/2019



HISTORY OF PRESENT ILLNESS:  The patient is an 88-year-old  female

patient, who was brought yesterday to the Emergency Room complaining of right

leg weakness that started about 5 days ago.  She did complain that she has

constant right leg weakness without pain, edema, erythema, or focal neurological

deficits.  She denied any headache, nausea, vomiting, blurring of vision,

tingling or numbness.  The patient's daughter stated that she was more lethargic

than her usual.  Did not want to eat and she was brought to the Emergency Room

where she was extensively investigated.  She had a CT scan of the head, which

was basically unremarkable.  Her lab work also showed that she has urinary tract

infection and was admitted for treatment of urinary tract infection and to

investigate her further.  In fact, on further questioning, she said her

complaint was mostly that her right hip gives way and in fact by the time she

arrived to the hospital, she was able to walk on her own from the bed to the

bathroom unassisted.  Unfortunately, around quarter to 12 this morning, she

became clear that she developed right-sided weakness.  She was definitely weaker

today and was unable to walk on her own.  The weakness involves both right upper

and right lower extremities and her voice was also somewhat dysarthric and

therefore a decision was made to transfer her to [facility].  We

did repeat another CT scan this morning to see if there are any changes and in

fact compared to yesterday's CT scan, today's CT scan showed no acute

intracranial abnormality identified.  With the CT scan, she had a small old

lacunar infarct seen on an old 2017 exam.  There is ongoing suspicion for more

recent infarct.  CT or MRI followup may be beneficial.  Unfortunately, the

patient has pacemaker that may or may not be compatible with the MRI, and

therefore, the patient was transferred to [facility] to consult

the neurologist and to arrange for bilateral carotid Doppler ultrasound and her

fasting lipid profile.  The patient is already on Plavix as well as apixaban. 

We gave her aspirin this morning.



PHYSICAL EXAMINATION:

GENERAL:  When I saw her this afternoon, she was sitting slightly propped up in

bed, in no apparent distress.  She was awake, alert, pale somewhat, but not

jaundiced, cyanosed, or thyromegaly.  No jugular venous distention.  No limb

edema.

VITAL SIGNS:  Her heart rate was 64, blood pressure was 127/75, temperature was

97.5, respiratory rate was 18 and oxygen saturation was 96%.

HEAD, EYES, EARS, NOSE, AND THROAT:  Showed normocephalic, atraumatic.

NECK:  Supple.

HEART:  Showed normal first and second heart sounds with no gallop, rub, or

murmur.

CHEST:  Clear to auscultation.  No crepitation or rhonchi.

ABDOMEN:  Distended, soft, nontender.

NEUROLOGIC:  She was awake, alert, responding appropriately.  All cranial nerves

intact.  She definitely has right-sided weakness.  She was unable to get out of

the bed to the wheelchair unaided and required 2-person assist.



LABORATORY DATA:  Her lab work this morning showed a white cell count of 4100,

hemoglobin 12.5, hematocrit 36, MCV 89, and platelet count of 168,000.  Her

chemistry showed a serum sodium of 141, potassium 3.9, chloride 107, bicarbonate

24, anion gap of 10, BUN 11, creatinine 0.9, estimated GFR was 59 mL per minute.

 Her glucose was 112, calcium was 8.6.  Total bilirubin, AST, ALT, alkaline

phosphatase were normal.  Total protein was 6.5, albumin was 3.4.  Her

urinalysis showed the urine was yellow, turbid with pH of 6, specific gravity of

1.010.  The urine was negative for protein, glucose, ketones, blood and positive

for nitrites and moderate amount of leukocyte esterase.  There is no rbc's,

11-20 wbc's, and many bacteria.  Her urine culture is still pending at the time

of this dictation.  She had a CT scan of the head on arrival yesterday, which

showed that there are mild bilateral periventricular white matter hypodensities,

likely chronic small vessel ischemic disease.  No evidence of acute intracranial

hemorrhage.  No extraaxial fluid collection.  No mass effect or midline shift. 

Ventricular size is appropriate.  Basal cisterns are patent.  No fracture

identified.  Gray-white differentiation is preserved.  Globes and orbits are

within normal limits.  Paranasal sinuses and mastoid air cells are clear.  She

has atherosclerotic calcification identified in the right vertebral artery.  Her

chest x-ray showed no radiographic evidence of acute cardiopulmonary process. 

X-ray of the hip and pelvis showed that the hip joints are symmetric with

sparing of the acetabulum bilaterally.  Sacroiliac joints are symmetric.  No

acute fracture or bony destructive changes were observed and a repeat CT scan of

the head without contrast this morning showed that there is no acute

intracranial hemorrhage identified.  Ventricular size is proportionate to the

sulcal spaces and stable interval.  Mild generalized supratentorial atrophy. 

There is again small focus of lower density of the left temporal matter,

unchanged compared to an older exam in 2017, also old lacunar infarct in the

lateral margin of the posterior right lateral ventricle also unchanged comparing

with older exam.  There is also small old lacunar infarct to the left basal

ganglia centered in the anterior limb of the left internal capsule.  As seen

previously, there is no new intraaxial mass effect or midline shift.  There is

atherosclerotic calcification of bilateral carotid siphons and right intradural

vertebral artery.  The patient will be transferred to [facility].

 We will consult Dr. Bearden.  We will arrange for her to have bilateral

Doppler ultrasound.  We will inquire whether her pacemaker is compatible with

MRI and check her lipid profile.  We will continue with Plavix and apixaban.





______________________________

MARYLU BUSTOS MD



DR:  MARIE/shantelle  JOB#:  [id number] / [id number]

DD:  01/08/2019 14:08  DT:  01/08/2019 15:44

## 2019-01-08 NOTE — RAD
CT HEAD WO CONTRAST

 

History: New onset right-sided weakness

 

Comparison: January 7, 2019; February 5, 2017

 

Technique: Noncontrast CT imaging was performed of the head.  Exposure: 

One or more of the following individualized dose reduction techniques were

utilized for this examination:  1. Automated exposure control  2. 

Adjustment of the mA and/or kV according to patient size  3. Use of 

iterative reconstruction technique.

 

Findings: There is some motion degradation. No acute intracranial 

hemorrhage is identified. Ventricular size is proportionate to the sulcal 

spaces and stable in interval, mild generalized supratentorial atrophy. 

There is again small focus of low density of the left temporal matter 

unchanged compared with older exam in 2017, also old lacunar infarct at 

the lateral margin of the posterior right lateral ventricle also unchanged

comparing with older exam. There is also small old lacunar infarct of the 

left basal ganglia centered in the anterior limb of left internal capsule 

as seen previously. There is no new intra-axial mass effect or midline 

shift. There is atherosclerotic calcification bilateral carotid siphons 

and right intradural vertebral artery.

 

Impression:

1. No acute intracranial abnormality is identified by CT, small old 

lacunar infarcts as seen on older 2017 exam. If there is ongoing suspicion

for more recent infarct, CT or MRI follow-up may be beneficial. There is 

again generalized supratentorial atrophy.

 

Electronically signed by: Damon Ohara MD (1/8/2019 1:47 PM) [facility]